# Patient Record
Sex: FEMALE | Race: BLACK OR AFRICAN AMERICAN | Employment: OTHER | URBAN - NONMETROPOLITAN AREA
[De-identification: names, ages, dates, MRNs, and addresses within clinical notes are randomized per-mention and may not be internally consistent; named-entity substitution may affect disease eponyms.]

---

## 2022-01-17 ENCOUNTER — HOSPITAL ENCOUNTER (EMERGENCY)
Age: 52
Discharge: HOME | End: 2022-01-17
Payer: COMMERCIAL

## 2022-01-17 ENCOUNTER — APPOINTMENT (OUTPATIENT)
Dept: GENERAL RADIOLOGY | Age: 52
End: 2022-01-17
Payer: COMMERCIAL

## 2022-01-17 VITALS
DIASTOLIC BLOOD PRESSURE: 79 MMHG | OXYGEN SATURATION: 98 % | TEMPERATURE: 97.7 F | SYSTOLIC BLOOD PRESSURE: 134 MMHG | RESPIRATION RATE: 16 BRPM | HEART RATE: 91 BPM

## 2022-01-17 DIAGNOSIS — J12.82 PNEUMONIA DUE TO COVID-19 VIRUS: Primary | ICD-10-CM

## 2022-01-17 DIAGNOSIS — U07.1 PNEUMONIA DUE TO COVID-19 VIRUS: Primary | ICD-10-CM

## 2022-01-17 PROCEDURE — 71046 X-RAY EXAM CHEST 2 VIEWS: CPT

## 2022-01-17 PROCEDURE — G0463 HOSPITAL OUTPT CLINIC VISIT: HCPCS

## 2022-01-17 PROCEDURE — 99203 OFFICE O/P NEW LOW 30 MIN: CPT

## 2022-01-17 PROCEDURE — 99202 OFFICE O/P NEW SF 15 MIN: CPT | Performed by: NURSE PRACTITIONER

## 2022-01-17 RX ORDER — DOXYCYCLINE HYCLATE 100 MG
100 TABLET ORAL 2 TIMES DAILY
Qty: 20 TABLET | Refills: 0 | Status: SHIPPED | OUTPATIENT
Start: 2022-01-17 | End: 2022-01-27

## 2022-01-17 RX ORDER — PREDNISONE 20 MG/1
20 TABLET ORAL 2 TIMES DAILY
Qty: 10 TABLET | Refills: 0 | Status: SHIPPED | OUTPATIENT
Start: 2022-01-17 | End: 2022-01-22

## 2022-01-17 RX ORDER — DEXTROMETHORPHAN HYDROBROMIDE AND PROMETHAZINE HYDROCHLORIDE 15; 6.25 MG/5ML; MG/5ML
5 SYRUP ORAL 4 TIMES DAILY PRN
Qty: 120 ML | Refills: 0 | Status: SHIPPED | OUTPATIENT
Start: 2022-01-17 | End: 2022-01-24

## 2022-01-17 ASSESSMENT — ENCOUNTER SYMPTOMS
BACK PAIN: 0
SORE THROAT: 0
SHORTNESS OF BREATH: 0
TROUBLE SWALLOWING: 0
RHINORRHEA: 0
NAUSEA: 0
SINUS CONGESTION: 0
SINUS PRESSURE: 0
VOMITING: 0
COUGH: 1
WHEEZING: 1
DIARRHEA: 0

## 2022-01-17 NOTE — ED PROVIDER NOTES
[facility]       CHIEF COMPLAINT       Chief Complaint   Patient presents with    Post-COVID Symptoms     x10 days        Nurses Notes reviewed and I agree except as noted in the HPI. HISTORY OF PRESENT ILLNESS   Esme Christianson is a 54 y.o. female who presents to the urgent care center with  stating that she is on day 8 of being diagnosed with COVID. Patient apparently has had some ups and downs over the past 10-day. With chills and fever and cough. The patient does have a pulse oximetry at home patient is sitting on table questions at the bedside they stated that her pulse oximetry has fluctuated between 91 and 94% over the past 10 days. Patient stated that she did start coughing up some blood-tinged sputum as well.  stated that when she was lying in bed at rest at times her heart rate would be up into the 120's. The patient stated that she would get short of breath with this exertion but that seems to be getting better. Patient at the present time is sitting in the chair skin is warm and dry does not appear to be in any acute distress. Patient denies any chest pain or shortness of breath at the present time. There is been no nausea vomiting or diarrhea. The history is provided by the patient. No  was used.    Cough  Cough characteristics:  Productive  Sputum characteristics:  Bloody and clear  Severity:  Mild  Onset quality:  Gradual  Duration:  1 day  Timing:  Intermittent  Progression:  Waxing and waning  Chronicity:  New  Smoker: no    Context: sick contacts    Context comment:   has COVID  Relieved by:  None tried  Worsened by:  Nothing  Ineffective treatments:  None tried  Associated symptoms: wheezing    Associated symptoms: no chest pain, no chills, no diaphoresis, no ear pain, no fever, no headaches, no rash, no rhinorrhea, no shortness of breath, no sinus congestion and no sore throat    Wheezing: Severity:  Mild    Onset quality:  Gradual    Duration:  1 week    Timing:  Intermittent    Progression:  Waxing and waning    Chronicity:  New  Risk factors: recent infection        REVIEW OF SYSTEMS     Review of Systems   Constitutional: Negative for activity change, appetite change, chills, diaphoresis, fatigue and fever. HENT: Positive for congestion. Negative for ear pain, rhinorrhea, sinus pressure, sore throat and trouble swallowing. Respiratory: Positive for cough and wheezing. Negative for shortness of breath. Cardiovascular: Negative for chest pain. Gastrointestinal: Negative for diarrhea, nausea and vomiting. Musculoskeletal: Negative for back pain and neck stiffness. Skin: Negative for rash. Allergic/Immunologic: Negative for environmental allergies. Neurological: Negative for dizziness, light-headedness and headaches. Hematological: Negative for adenopathy. PAST MEDICAL HISTORY   History reviewed. No pertinent past medical history. SURGICALHISTORY     Patient  has no past surgical history on file. CURRENT MEDICATIONS       Previous Medications    No medications on file       ALLERGIES     Patient is has No Known Allergies. Patients   There is no immunization history on file for this patient. FAMILY HISTORY     Patient's family history is not on file. SOCIAL HISTORY     Patient  reports that she has never smoked. She has never used smokeless tobacco.    PHYSICAL EXAM     ED TRIAGE VITALS  BP: 134/79, Temp: 97.7 °F (36.5 °C), Pulse: 91, Resp: 16, SpO2: 98 %,There is no height or weight on file to calculate BMI.,No LMP recorded. Physical Exam  Vitals and nursing note reviewed. Constitutional:       General: She is not in acute distress. Appearance: Normal appearance. She is well-developed and well-groomed. She is not ill-appearing, toxic-appearing or diaphoretic. HENT:      Head: Normocephalic.       Right Ear: Hearing, tympanic membrane, ear canal and external ear normal. No drainage, swelling or tenderness. No mastoid tenderness. Left Ear: Hearing, tympanic membrane, ear canal and external ear normal. No drainage, swelling or tenderness. No mastoid tenderness. Nose: Nose normal.      Right Sinus: No maxillary sinus tenderness or frontal sinus tenderness. Left Sinus: No maxillary sinus tenderness or frontal sinus tenderness. Mouth/Throat:      Lips: Pink. Mouth: Mucous membranes are moist.      Pharynx: Uvula midline. No posterior oropharyngeal erythema or uvula swelling. Tonsils: No tonsillar exudate or tonsillar abscesses. Eyes:      Conjunctiva/sclera: Conjunctivae normal.      Pupils: Pupils are equal, round, and reactive to light. Cardiovascular:      Rate and Rhythm: Normal rate and regular rhythm. Heart sounds: Normal heart sounds. Pulmonary:      Effort: Pulmonary effort is normal. No accessory muscle usage or respiratory distress. Breath sounds: Examination of the right-lower field reveals wheezing and rales. Examination of the left-lower field reveals wheezing and rales. Wheezing and rales present. No decreased breath sounds or rhonchi. Chest:   Breasts:      Right: No supraclavicular adenopathy. Left: No supraclavicular adenopathy. Abdominal:      General: Bowel sounds are normal.      Palpations: Abdomen is soft. Tenderness: There is no right CVA tenderness or guarding. Negative signs include Lazo's sign. Musculoskeletal:      Cervical back: Full passive range of motion without pain and normal range of motion. No rigidity. Lymphadenopathy:      Head:      Right side of head: No submental, submandibular, tonsillar, preauricular, posterior auricular or occipital adenopathy. Left side of head: No submental, submandibular, tonsillar, preauricular, posterior auricular or occipital adenopathy. Cervical: No cervical adenopathy.       Right cervical: No superficial, deep or posterior cervical adenopathy. Left cervical: No superficial, deep or posterior cervical adenopathy. Upper Body:      Right upper body: No supraclavicular adenopathy. Left upper body: No supraclavicular adenopathy. Skin:     General: Skin is warm and dry. Capillary Refill: Capillary refill takes less than 2 seconds. Findings: No rash. Neurological:      Mental Status: She is alert and oriented to person, place, and time. Psychiatric:         Mood and Affect: Mood normal.         Behavior: Behavior normal. Behavior is cooperative. Wells score:    0  (points) clinically suspected DVT - 3 points  0  (points)alternative diagnosis is less likely than PE - 3.0 points  0  (points) tachycardia > 100 - 1.5 points  0  (points) immobile at least 3 days or surgery in previous four weeks - 1.5points  0  (points) history of DVT or PE - 1.5 points  1  (points) hemoptysis - 1.0 points  0  (points) malignancy (treatment for within 6 months, palliative) - 1.0 points    Score:    Interpretation Inga Johnson et al. 2007 Radiology 242:15-21)   Score >6.0 High (probablility 59% based on pooled data)   Score 2.0-6.0 Moderate  (probability 29% based on pooled data)   Score <2.0 - Low (probability 15% based on pooled data    I do not suspect PE per [facility] criteria:   Age less than 50   Pulse ox greater than or equal to 98% on room air   Heart rate less than 100   No prior venous embolism   No recent surgery or trauma requiring hospitalization within the prior 4 weeks   No hemoptysis   No estrogen use   No unilateral leg swelling    Inga Johnson et al. 2007 Radiology 242:[id number])  2006;[id number]. DIAGNOSTIC RESULTS     Labs:No results found for this visit on 22. IMAGIN:30 pm patient ambulatory to x-ray without any assistance. XR CHEST (2 VW)   Final Result   1. Hazy right middle lobe consolidation is seen. Findings can relate to infiltrates.             **This report has been created using voice recognition software. It may contain minor errors which are inherent in voice recognition technology. **      Final report electronically signed by Dr Alvaro Oh on 1/17/2022 6:00 PM            EKG:      URGENT CARE COURSE:     Vitals:    01/17/22 1637   BP: 134/79   Pulse: 91   Resp: 16   Temp: 97.7 °F (36.5 °C)   TempSrc: Tympanic   SpO2: 98%       Medications - No data to display         PROCEDURES:  None    FINAL IMPRESSION      1. Pneumonia due to COVID-19 virus          DISPOSITION/ PLAN      The patient/Patient representative was advised to rest, drink lots of fluids, take Motrin and Tylenol for any fever, chills generalized body aches. The patient was also advised to monitor urine output for signs of dehydration. If the patient develops any chest pain, shortness of breath, neck pain or stiffness or abdominal pain or any other concerns, the patient is to call 911 or go to the emergency department for further evaluation. If the patient does not experience any of the above symptoms he is to follow-up with his primary care provider in 2-3 days for reevaluation. The patient/Patient representative are agreeable to the treatment plan at this time. The patient left the urgent care center in stable condition. PATIENT REFERRED TO:  No primary care provider on file. No primary physician on file.       DISCHARGE MEDICATIONS:  New Prescriptions    DOXYCYCLINE HYCLATE (VIBRA-TABS) 100 MG TABLET    Take 1 tablet by mouth 2 times daily for 10 days    PREDNISONE (DELTASONE) 20 MG TABLET    Take 1 tablet by mouth 2 times daily for 5 days    PROMETHAZINE-DEXTROMETHORPHAN (PROMETHAZINE-DM) 6.25-15 MG/5ML SYRUP    Take 5 mLs by mouth 4 times daily as needed for Cough       Discontinued Medications    No medications on file       Current Discharge Medication List          PHILL Kim - CNP    (Please note that portions of this note were completed with a voice recognition program. Efforts were made to edit the dictations but occasionally words are mis-transcribed.)           Marianne Buckner, PHILL - CNP  01/17/22 [id number]

## 2022-01-19 RX ORDER — ALBUTEROL SULFATE 90 UG/1
2 AEROSOL, METERED RESPIRATORY (INHALATION) EVERY 4 HOURS PRN
Qty: 1 EACH | Refills: 0 | Status: SHIPPED | OUTPATIENT
Start: 2022-01-19 | End: 2022-04-04

## 2022-01-19 NOTE — ED NOTES
Pt called in asking for inhaler. After reviewing with placido mireles. Pt advised we would send it over.  Pt verbalized understanding     Brook Sims RN  01/19/22 1007

## 2022-02-09 ENCOUNTER — TELEPHONE (OUTPATIENT)
Dept: FAMILY MEDICINE CLINIC | Age: 52
End: 2022-02-09

## 2022-02-09 ENCOUNTER — OFFICE VISIT (OUTPATIENT)
Dept: FAMILY MEDICINE CLINIC | Age: 52
End: 2022-02-09
Payer: COMMERCIAL

## 2022-02-09 VITALS
HEIGHT: 66 IN | TEMPERATURE: 98.2 F | OXYGEN SATURATION: 98 % | RESPIRATION RATE: 16 BRPM | BODY MASS INDEX: 33.46 KG/M2 | HEART RATE: 88 BPM | DIASTOLIC BLOOD PRESSURE: 70 MMHG | WEIGHT: 208.2 LBS | SYSTOLIC BLOOD PRESSURE: 110 MMHG

## 2022-02-09 DIAGNOSIS — Z13.220 LIPID SCREENING: ICD-10-CM

## 2022-02-09 DIAGNOSIS — G57.92 NEUROPATHY OF LEFT ANKLE: ICD-10-CM

## 2022-02-09 DIAGNOSIS — Z13.1 SCREENING FOR DIABETES MELLITUS (DM): ICD-10-CM

## 2022-02-09 DIAGNOSIS — Z12.11 COLON CANCER SCREENING: ICD-10-CM

## 2022-02-09 DIAGNOSIS — Z00.00 ANNUAL PHYSICAL EXAM: Primary | ICD-10-CM

## 2022-02-09 DIAGNOSIS — Z12.31 ENCOUNTER FOR SCREENING MAMMOGRAM FOR MALIGNANT NEOPLASM OF BREAST: ICD-10-CM

## 2022-02-09 PROCEDURE — 99386 PREV VISIT NEW AGE 40-64: CPT | Performed by: NURSE PRACTITIONER

## 2022-02-09 RX ORDER — AMITRIPTYLINE HYDROCHLORIDE 10 MG/1
TABLET, FILM COATED ORAL
COMMUNITY
Start: 2022-01-12 | End: 2022-10-21

## 2022-02-09 SDOH — ECONOMIC STABILITY: FOOD INSECURITY: WITHIN THE PAST 12 MONTHS, YOU WORRIED THAT YOUR FOOD WOULD RUN OUT BEFORE YOU GOT MONEY TO BUY MORE.: NEVER TRUE

## 2022-02-09 SDOH — ECONOMIC STABILITY: FOOD INSECURITY: WITHIN THE PAST 12 MONTHS, THE FOOD YOU BOUGHT JUST DIDN'T LAST AND YOU DIDN'T HAVE MONEY TO GET MORE.: NEVER TRUE

## 2022-02-09 ASSESSMENT — PATIENT HEALTH QUESTIONNAIRE - PHQ9
7. TROUBLE CONCENTRATING ON THINGS, SUCH AS READING THE NEWSPAPER OR WATCHING TELEVISION: 0
5. POOR APPETITE OR OVEREATING: 0
SUM OF ALL RESPONSES TO PHQ QUESTIONS 1-9: 6
2. FEELING DOWN, DEPRESSED OR HOPELESS: 1
10. IF YOU CHECKED OFF ANY PROBLEMS, HOW DIFFICULT HAVE THESE PROBLEMS MADE IT FOR YOU TO DO YOUR WORK, TAKE CARE OF THINGS AT HOME, OR GET ALONG WITH OTHER PEOPLE: 2
1. LITTLE INTEREST OR PLEASURE IN DOING THINGS: 2
3. TROUBLE FALLING OR STAYING ASLEEP: 1
SUM OF ALL RESPONSES TO PHQ QUESTIONS 1-9: 6
SUM OF ALL RESPONSES TO PHQ9 QUESTIONS 1 & 2: 3
8. MOVING OR SPEAKING SO SLOWLY THAT OTHER PEOPLE COULD HAVE NOTICED. OR THE OPPOSITE, BEING SO FIGETY OR RESTLESS THAT YOU HAVE BEEN MOVING AROUND A LOT MORE THAN USUAL: 1
SUM OF ALL RESPONSES TO PHQ QUESTIONS 1-9: 6
SUM OF ALL RESPONSES TO PHQ QUESTIONS 1-9: 6
9. THOUGHTS THAT YOU WOULD BE BETTER OFF DEAD, OR OF HURTING YOURSELF: 0
6. FEELING BAD ABOUT YOURSELF - OR THAT YOU ARE A FAILURE OR HAVE LET YOURSELF OR YOUR FAMILY DOWN: 0
4. FEELING TIRED OR HAVING LITTLE ENERGY: 1

## 2022-02-09 ASSESSMENT — ENCOUNTER SYMPTOMS
FACIAL SWELLING: 0
COLOR CHANGE: 0
COUGH: 0
BACK PAIN: 0
SHORTNESS OF BREATH: 0
TROUBLE SWALLOWING: 0
VOMITING: 0
EYE PAIN: 0
SORE THROAT: 0
NAUSEA: 0
ABDOMINAL PAIN: 0
DIARRHEA: 0
WHEEZING: 0
SINUS PAIN: 0

## 2022-02-09 ASSESSMENT — SOCIAL DETERMINANTS OF HEALTH (SDOH): HOW HARD IS IT FOR YOU TO PAY FOR THE VERY BASICS LIKE FOOD, HOUSING, MEDICAL CARE, AND HEATING?: NOT HARD AT ALL

## 2022-02-09 NOTE — PATIENT INSTRUCTIONS
Patient Education        Mammogram: About This Test  What is it? A mammogram is an X-ray of the breast that is used to screen for breast cancer. This test can find tumors that are too small for you or your doctor to feel. Cancer is most easily treated when it is found at an early stage. Why is this test done? A mammogram is done to:  · Look for breast cancer when there are no symptoms. · Find breast cancer when there are symptoms. Symptoms of breast cancer may include a lump or thickening in the breast, nipple discharge, or dimpling of the skin on one area of the breast.  · Find an area of suspicious breast tissue to remove for an exam under a microscope (biopsy). How do you prepare for the test?  If you've had a mammogram before at another clinic, have the results sent or bring them with you to your appointment. On the day of the mammogram, don't use any deodorant. And don't use perfume, powders, or ointments near or on your breasts. The residue left on your skin by these substances may interfere with the X-rays. How is the test done? · You will need to take off any jewelry that might interfere with the X-ray pictures. · You will need to take off your clothes above the waist.  · You will be given a cloth or paper gown to use during the test.  · You probably will stand during the mammogram.  · One at a time, your breasts will be placed on a flat plate. · Another plate is then pressed firmly against your breast to help flatten out the breast tissue. You may be asked to lift your arm. · For a few seconds while the X-ray picture is being taken, you will need to hold your breath. · At least two pictures are taken of each breast. One is taken from the top and one from the side. How does having a mammogram feel? A mammogram is often uncomfortable but rarely painful.  If you have sensitive or fragile skin or a skin condition, let the technician know before you have your exam. If you have menstrual periods, the procedure is more comfortable when done within 2 weeks after your period has ended. Having your breasts flattened is usually uncomfortable, but it helps the technician get the best images. How long does the test take? · The test will take about 10 to 15 minutes. You may be in the clinic for up to an hour. · You may be asked to wait a few minutes while the images are checked to make sure they don't need to be redone. What happens after the test?  · You will probably be able to go home right away. · You can go back to your usual activities right away. Follow-up care is a key part of your treatment and safety. Be sure to make and go to all appointments, and call your doctor if you are having problems. It's also a good idea to keep a list of the medicines you take. Ask your doctor when you can expect to have your test results. Where can you learn more? Go to https://oNoise.Pogoplug. org and sign in to your Airbnb account. Enter I713 in the Digitalsmiths box to learn more about \"Mammogram: About This Test.\"     If you do not have an account, please click on the \"Sign Up Now\" link. Current as of: September 8, 2021               Content Version: 13.1  © 2006-2021 Healthwise, Incorporated. Care instructions adapted under license by [facility] ([facility]). If you have questions about a medical condition or this instruction, always ask your healthcare professional. [place] [address] any warranty or liability for your use of this information. Patient Education        Colon Cancer Screening: Care Instructions  Overview     Colorectal cancer occurs in the colon or rectum. That's the lower part of your digestive system. It often starts in small growths called polyps in the colon or rectum. Polyps are usually found with screening tests. Depending on the type of test, any polyps found may be removed during the tests.   Colorectal cancer usually does not cause symptoms at first. But regular tests can help find it early, before it spreads and becomes harder to treat. Your risk for colorectal cancer gets higher as you get older. Experts recommend starting screening at age 39 for people who are at average risk. Talk with your doctor about your risk and when to start and stop screening. You may have one of several tests. Follow-up care is a key part of your treatment and safety. Be sure to make and go to all appointments, and call your doctor if you are having problems. It's also a good idea to know your test results and keep a list of the medicines you take. What are the main screening tests for colon cancer? The screening tests are:  Stool tests. These include the guaiac fecal occult blood test (gFOBT), the fecal immunochemical test (FIT), and the combined fecal immunochemical test and stool DNA test (FIT-DNA). These tests check stool samples for signs of cancer. If your test is positive, you will need to have a colonoscopy. Sigmoidoscopy. This test lets your doctor look at the lining of your rectum and the lowest part of your colon. Your doctor uses a lighted tube called a sigmoidoscope. This test can't find cancers or polyps in the upper part of your colon. In some cases, polyps that are found can be removed. But if your doctor finds polyps, you will need to have a colonoscopy to check the upper part of your colon. Colonoscopy. This test lets your doctor look at the lining of your rectum and your entire colon. The doctor uses a thin, flexible tool called a colonoscope. It can also be used to remove polyps or get a tissue sample (biopsy). A less common test is CT colonography (CTC). It's also called virtual colonoscopy. Who should be screened for colorectal cancer? Your risk for colorectal cancer gets higher as you get older. Experts recommend starting screening at age 39 for people who are at average risk.  Talk with your doctor about your risk and when to start and stop screening. How often you need screening depends on the type of test you get:  Stool tests. Every year for FIT or gFOBT. Every 1 to 3 years for sDNA, also called FIT-DNA. Tests that look inside the colon. Every 5 years for sigmoidoscopy. (If you do the FIT test every year, you can get this test every 10 years.)  Every 5 years for CT colonography (virtual colonoscopy). Every 10 years for colonoscopy. Experts agree that people at higher risk may need to be tested sooner and more often. This includes people who have a strong family history of colon cancer. Talk to your doctor about which test is best for you and when to be tested. When should you call for help? Watch closely for changes in your health, and be sure to contact your doctor if:    · You have any changes in your bowel habits.     · You have any problems. Where can you learn more? Go to https://JackBepeAfluenta.RelayFoods. org and sign in to your True Office account. Enter [phone] [id number] [phone] in the PromisePay box to learn more about \"Colon Cancer Screening: Care Instructions. \"     If you do not have an account, please click on the \"Sign Up Now\" link. Current as of: September 8, 2021               Content Version: 13.1  © 2006-2021 [employer]. Care instructions adapted under license by [facility] ([facility]). If you have questions about a medical condition or this instruction, always ask your healthcare professional. [place] [address] any warranty or liability for your use of this information. Patient Education        Well Visit, Ages 25 to 48: Care Instructions  Overview     Well visits can help you stay healthy. Your doctor has checked your overall health and may have suggested ways to take good care of yourself. Your doctor also may have recommended tests. At home, you can help prevent illness with healthy eating, regular exercise, and other steps. Follow-up care is a key part of your treatment and safety.  Be sure to make and go to all appointments, and call your doctor if you are having problems. It's also a good idea to know your test results and keep a list of the medicines you take. How can you care for yourself at home? · Get screening tests that you and your doctor decide on. Screening helps find diseases before any symptoms appear. · Eat healthy foods. Choose fruits, vegetables, whole grains, protein, and low-fat dairy foods. Limit fat, especially saturated fat. Reduce salt in your diet. · Limit alcohol. If you are a man, have no more than 2 drinks a day or 14 drinks a week. If you are a woman, have no more than 1 drink a day or 7 drinks a week. · Get at least 30 minutes of physical activity on most days of the week. Walking is a good choice. You also may want to do other activities, such as running, swimming, cycling, or playing tennis or team sports. Discuss any changes in your exercise program with your doctor. · Reach and stay at a healthy weight. This will lower your risk for many problems, such as obesity, diabetes, heart disease, and high blood pressure. · Do not smoke or allow others to smoke around you. If you need help quitting, talk to your doctor about stop-smoking programs and medicines. These can increase your chances of quitting for good. · Care for your mental health. It is easy to get weighed down by worry and stress. Learn strategies to manage stress, like deep breathing and mindfulness, and stay connected with your family and community. If you find you often feel sad or hopeless, talk with your doctor. Treatment can help. · Talk to your doctor about whether you have any risk factors for sexually transmitted infections (STIs). You can help prevent STIs if you wait to have sex with a new partner (or partners) until you've each been tested for STIs. It also helps if you use condoms (male or female condoms) and if you limit your sex partners to one person who only has sex with you.  Vaccines are available for some STIs, such as HPV. · Use birth control if it's important to you to prevent pregnancy. Talk with your doctor about the choices available and what might be best for you. · If you think you may have a problem with alcohol or drug use, talk to your doctor. This includes prescription medicines (such as amphetamines and opioids) and illegal drugs (such as cocaine and methamphetamine). Your doctor can help you figure out what type of treatment is best for you. · Protect your skin from too much sun. When you're outdoors from 10 a.m. to 4 p.m., stay in the shade or cover up with clothing and a hat with a wide brim. Wear sunglasses that block UV rays. Even when it's cloudy, put broad-spectrum sunscreen (SPF 30 or higher) on any exposed skin. · See a dentist one or two times a year for checkups and to have your teeth cleaned. · Wear a seat belt in the car. When should you call for help? Watch closely for changes in your health, and be sure to contact your doctor if you have any problems or symptoms that concern you. Where can you learn more? Go to https://EcoStartpepiceweb.healthmyParcelDelivery. org and sign in to your OpenChime account. Enter P072 in the Hibernia Networks box to learn more about \"Well Visit, Ages 25 to 48: Care Instructions. \"     If you do not have an account, please click on the \"Sign Up Now\" link. Current as of: October 6, 2021               Content Version: 13.1  © 2006-2021 Healthwise, Incorporated. Care instructions adapted under license by [facility] ([facility]). If you have questions about a medical condition or this instruction, always ask your healthcare professional. [place] [address] any warranty or liability for your use of this information.

## 2022-02-09 NOTE — TELEPHONE ENCOUNTER
Noted.  New prescription sent.  -WS    Orders Placed This Encounter   Medications    diclofenac sodium (VOLTAREN) 1 % GEL     Sig: Apply 4 g topically 4 times daily as needed for Pain     Dispense:  100 g     Refill:  5

## 2022-02-09 NOTE — PROGRESS NOTES
[address] [facility] [address]  Dept: [phone]  Dept Fax: [phone]: [phone]     2022    Jd Hansen (:  1970) is a 54 y.o. female, here for evaluation of the following medical concerns:  Chief Complaint   Patient presents with    New Patient     Get established. Patient had Covid about 2 weeks ago and would like to follow up on that. She had pneumonia at the same time with her Covid. She was put on antibiotics which she finished. She would also like to discuss her left foot and chronic pain as well. Will be seeing a dr on the  about nerve pain in her foot/ankle. HPI    Pt presents to the office today for new patient appt. She moved here from [place]. Pt has a 2 year HX of left foot pain and has had multiple surgeries on it. Will be following up with Dr Lupis aMuricio for a second opinion. Last surgery was Aug 2021. Wearing a walking boot from the MD in [facility]. She also has surgery in [place]. There is some confusion on where the pain is coming from and she hopes Dr Robe Chan will be able to sort this out for her. See note below from care everywhere from 2021. From [place] Provider about neuroma of the left foot 2021 from Care everywhere charting: The clinical nature of this call was regarding the results of her injection of her left terminal calcaneal branch. I reviewed the injection from  with Dr. Lily Stark. It went well with induced numbness in her heel. My findings were that the injection increased numbness in her heel. It helped with her pain until that evening, but she did not like the feeling of numbness. It reduced her pain by 50%. I did not expect it to completely get rid of her pain since she also has a sural nerve neuroma. COVID symptoms improved. She no longer has much drainage. Denies any chest pain or SOB. Ablation in 2019- No PAP since.  She is due. Mammogram is UTD, but is due soon. No other health problems. Review of Systems   Constitutional: Negative for chills, fatigue and fever. HENT: Negative for congestion, facial swelling, sinus pain, sore throat and trouble swallowing. Eyes: Negative for pain and visual disturbance. Respiratory: Negative for cough, shortness of breath and wheezing. Cardiovascular: Negative for chest pain and palpitations. Gastrointestinal: Negative for abdominal pain, diarrhea, nausea and vomiting. Genitourinary: Negative for difficulty urinating, dysuria and urgency. Musculoskeletal: Negative for back pain, gait problem and neck pain. Left foot pain and swelling. Skin: Negative for color change and rash. Neurological: Negative for dizziness, weakness and headaches. Psychiatric/Behavioral: Negative for agitation and sleep disturbance. The patient is not nervous/anxious. Prior to Visit Medications    Medication Sig Taking?  Authorizing Provider   amitriptyline (ELAVIL) 10 MG tablet TAKE 1 TABLET BY MOUTH EVERY DAY Yes Historical Provider, MD   Aspirin 81 MG CAPS daily Yes Historical Provider, MD   OLIVE LEAF PO Take by mouth Yes Historical Provider, MD   ZINC PO Take by mouth Yes Historical Provider, MD   Ascorbic Acid (VITAMIN C PO) Take by mouth Yes Historical Provider, MD   VITAMIN D PO Take by mouth Yes Historical Provider, MD   Cyanocobalamin (VITAMIN B-12 PO) Take by mouth Yes Historical Provider, MD   Probiotic Product (PROBIOTIC PO) Take by mouth Yes Historical Provider, MD   diclofenac sodium (VOLTAREN) 1 % GEL Apply topically 2 times daily Yes Vensusie Salaam APRN - CNP   albuterol sulfate  (90 Base) MCG/ACT inhaler Inhale 2 puffs into the lungs every 4 hours as needed for Wheezing or Shortness of Breath Yes Tasha Carranza APRN - CNP   diclofenac sodium (VOLTAREN) 1 % GEL Apply 4 g topically 4 times daily as needed for Pain  Venida Salaam, APRN - CNP Allergies   Allergen Reactions    Oxycodone Itching    Shellfish Allergy Anaphylaxis and Itching       History reviewed. No pertinent past medical history. Past Surgical History:   Procedure Laterality Date    ANTERIOR CRUCIATE LIGAMENT REPAIR Left 2006     SECTION  1995    FOOT SURGERY Left 2020    Flat foot reconstruction    NERVE GRAFT Left 2021    Left foot nerve graft    OTHER SURGICAL HISTORY  2019    Ablation surgery       Social History     Socioeconomic History    Marital status:      Spouse name: Not on file    Number of children: Not on file    Years of education: Not on file    Highest education level: Not on file   Occupational History    Not on file   Tobacco Use    Smoking status: Never Smoker    Smokeless tobacco: Never Used   Vaping Use    Vaping Use: Never used   Substance and Sexual Activity    Alcohol use: Not on file    Drug use: Never    Sexual activity: Not on file   Other Topics Concern    Not on file   Social History Narrative    Not on file     Social Determinants of Health     Financial Resource Strain: Low Risk     Difficulty of Paying Living Expenses: Not hard at all   Food Insecurity: No Food Insecurity    Worried About [address] in the Last Year: Never true    [address] in the Last Year: Never true   Transportation Needs:     Lack of Transportation (Medical): Not on file    Lack of Transportation (Non-Medical):  Not on file   Physical Activity:     Days of Exercise per Week: Not on file    Minutes of Exercise per Session: Not on file   Stress:     Feeling of Stress : Not on file   Social Connections:     Frequency of Communication with Friends and Family: Not on file    Frequency of Social Gatherings with Friends and Family: Not on file    Attends Quaker Services: Not on file    Active Member of Clubs or Organizations: Not on file    Attends Club or Organization Meetings: Not on file    Marital Status: Not on file   Intimate Partner Violence:     Fear of Current or Ex-Partner: Not on file    Emotionally Abused: Not on file    Physically Abused: Not on file    Sexually Abused: Not on file   Housing Stability:     Unable to Pay for Housing in the Last Year: Not on file    Number of [place] in the Last Year: Not on file    Unstable Housing in the Last Year: Not on file        Family History   Problem Relation Age of Onset    Diabetes Mother        Vitals:    02/09/22 1326   BP: 110/70   Pulse: 88   Resp: 16   Temp: 98.2 °F (36.8 °C)   TempSrc: Oral   SpO2: 98%   Weight: 208 lb 3.2 oz (94.4 kg)   Height: 5' 5.55\" (1.665 m)     Estimated body mass index is 34.07 kg/m² as calculated from the following:    Height as of this encounter: 5' 5.55\" (1.665 m). Weight as of this encounter: 208 lb 3.2 oz (94.4 kg). Physical Exam  Vitals reviewed. Constitutional:       General: She is not in acute distress. Appearance: Normal appearance. She is well-developed. HENT:      Head: Normocephalic and atraumatic. Right Ear: Hearing, ear canal and external ear normal.      Left Ear: Hearing, ear canal and external ear normal.      Nose: Nose normal. No nasal tenderness. Mouth/Throat:      Lips: Pink. Mouth: Mucous membranes are moist. No oral lesions. Pharynx: Oropharynx is clear. Uvula midline. Eyes:      General:         Right eye: No discharge. Left eye: No discharge. Conjunctiva/sclera: Conjunctivae normal.   Neck:      Vascular: No carotid bruit. Trachea: No tracheal deviation. Cardiovascular:      Rate and Rhythm: Normal rate and regular rhythm. Heart sounds: Normal heart sounds. No murmur heard. Pulmonary:      Effort: Pulmonary effort is normal. No respiratory distress. Breath sounds: Normal breath sounds. Abdominal:      General: Bowel sounds are normal.      Palpations: Abdomen is soft. Tenderness: There is no abdominal tenderness. Musculoskeletal:      Cervical back: Full passive range of motion without pain and neck supple. Left foot: Decreased range of motion. Swelling and tenderness present. Feet:      Left foot:      Skin integrity: Dry skin present. No erythema or warmth. Toenail Condition: Left toenails are normal.   Lymphadenopathy:      Head:      Right side of head: No submental, submandibular, tonsillar, preauricular, posterior auricular or occipital adenopathy. Left side of head: No submental, submandibular, tonsillar, preauricular, posterior auricular or occipital adenopathy. Cervical: No cervical adenopathy. Skin:     General: Skin is warm and dry. Findings: No rash. Neurological:      General: No focal deficit present. Mental Status: She is alert and oriented to person, place, and time. Coordination: Coordination normal.   Psychiatric:         Behavior: Behavior normal.         Thought Content: Thought content normal.         Judgment: Judgment normal.         ASSESSMENT/PLAN:  1. Neuropathy of left ankle  - diclofenac sodium (VOLTAREN) 1 % GEL; Apply topically 2 times daily  Dispense: 150 g; Refill: 5    2. Encounter for screening mammogram for malignant neoplasm of breast  - ADRIANA DIGITAL SCREEN W OR WO CAD BILATERAL; Future    3. Colon cancer screening  - Fecal DNA Colorectal cancer screening (Cologuard)    4. Lipid screening  - Lipid Panel; Future    5. Screening for diabetes mellitus (DM)  - Glucose, Fasting; Future    6. Annual physical exam    - Follow up with Dr Zack Barnes as planned. Please let our office know if we can help with anything else. - Wear walking boot until follow up  - Have labs completed after 12 hours fasting.   - We can do PAP testing in our office. Pt will think about and make an appt if needed.    - Call office with any questions or concerns, or if symptoms are getting worse or changing      Return in about 1 year (around 2/9/2023) for Wellness/Physical, Routine follow up. Patient given educational materials - see patient instructions. Discussed use, benefit, and side effects of prescribed medications. All patient questions answered. Pt voiced understanding. Reviewed health maintenance. An  electronic signature was used to authenticate this note.     --PHILL Boland - CNP on 2/10/2022 at 7:35 AM

## 2022-02-25 ENCOUNTER — OFFICE VISIT (OUTPATIENT)
Dept: FAMILY MEDICINE CLINIC | Age: 52
End: 2022-02-25
Payer: COMMERCIAL

## 2022-02-25 VITALS
SYSTOLIC BLOOD PRESSURE: 120 MMHG | RESPIRATION RATE: 16 BRPM | WEIGHT: 206.2 LBS | DIASTOLIC BLOOD PRESSURE: 72 MMHG | HEART RATE: 72 BPM | TEMPERATURE: 98.2 F | BODY MASS INDEX: 33.74 KG/M2

## 2022-02-25 DIAGNOSIS — K64.4 EXTERNAL HEMORRHOIDS: Primary | ICD-10-CM

## 2022-02-25 DIAGNOSIS — G47.9 SLEEPING DIFFICULTY: ICD-10-CM

## 2022-02-25 PROCEDURE — 99214 OFFICE O/P EST MOD 30 MIN: CPT | Performed by: NURSE PRACTITIONER

## 2022-02-25 RX ORDER — ESZOPICLONE 2 MG/1
2 TABLET, FILM COATED ORAL NIGHTLY PRN
Qty: 30 TABLET | Refills: 0 | Status: SHIPPED | OUTPATIENT
Start: 2022-02-25 | End: 2022-04-07

## 2022-02-25 RX ORDER — HYDROCORTISONE ACETATE 25 MG/1
25 SUPPOSITORY RECTAL EVERY 12 HOURS
Qty: 20 SUPPOSITORY | Refills: 0 | Status: SHIPPED | OUTPATIENT
Start: 2022-02-25 | End: 2022-04-07

## 2022-02-25 ASSESSMENT — ENCOUNTER SYMPTOMS
ABDOMINAL PAIN: 0
ANAL BLEEDING: 0
RECTAL PAIN: 1
NAUSEA: 0
ABDOMINAL DISTENTION: 0
DIARRHEA: 0
BLOOD IN STOOL: 0
COLOR CHANGE: 0
BACK PAIN: 0

## 2022-02-25 NOTE — PATIENT INSTRUCTIONS
Patient Education        Hemorrhoids: Care Instructions  Overview     Hemorrhoids are swollen veins that develop in the anal canal. Bleeding during bowel movements, itching, and rectal pain are the most common symptoms. Hemorrhoids can be uncomfortable at times, but rarely are they a serious problem. Most of the time, you can treat them with simple changes to your diet and bowel habits. These changes include eating more fiber and not straining to pass stools. Most hemorrhoids don't need surgery or other treatment unless they are very large and painful or bleed a lot. Follow-up care is a key part of your treatment and safety. Be sure to make and go to all appointments, and call your doctor if you are having problems. It's also a good idea to know your test results and keep a list of the medicines you take. How can you care for yourself at home? · Sit in a few inches of warm water (sitz bath) 3 times a day and after bowel movements. The warm water helps with pain and itching. · Put ice on your anal area several times a day for 10 minutes at a time. Put a thin cloth between the ice and your skin. Follow this by placing a warm, wet towel on the area for another 10 to 20 minutes. · Take pain medicines exactly as directed. ? If the doctor gave you a prescription medicine for pain, take it as prescribed. ? If you are not taking a prescription pain medicine, ask your doctor if you can take an over-the-counter medicine. · Keep the anal area clean, but be gentle. Use water and a fragrance-free soap, or use baby wipes or medicated pads such as Tucks. · Wear cotton underwear and loose clothing to decrease moisture in the anal area. · Eat more fiber. Include foods such as whole-grain breads and cereals, raw vegetables, raw and dried fruits, and beans. · Drink plenty of fluids.  If you have kidney, heart, or liver disease and have to limit fluids, talk with your doctor before you increase the amount of fluids you drink.  · Use a stool softener that contains bran or psyllium. You can save money by buying bran or psyllium (available in bulk at most health food stores) and sprinkling it on foods or stirring it into fruit juice. Or you can use a product such as Metamucil or Hydrocil. · Practice healthy bowel habits. ? Go to the bathroom as soon as you have the urge. ? Avoid straining to pass stools. Relax and give yourself time to let things happen naturally. ? Do not hold your breath while passing stools. ? Do not read while sitting on the toilet. Get off the toilet as soon as you have finished. · Take your medicines exactly as prescribed. Call your doctor if you think you are having a problem with your medicine. When should you call for help? Call 911 anytime you think you may need emergency care. For example, call if:    · You pass maroon or very bloody stools. Call your doctor now or seek immediate medical care if:    · You have increased pain.     · You have increased bleeding. Watch closely for changes in your health, and be sure to contact your doctor if:    · Your symptoms have not improved after 3 or 4 days. Where can you learn more? Go to [email or web address] and sign in to your sfilatino account. Enter A643 in the [facility] box to learn more about \"Hemorrhoids: Care Instructions. \"     If you do not have an account, please click on the \"Sign Up Now\" link. Current as of: September 8, 2021               Content Version: 13.1  © 2006-2021 Healthwise, Incorporated. Care instructions adapted under license by [facility] ([facility]). If you have questions about a medical condition or this instruction, always ask your healthcare professional. [place] [address] any warranty or liability for your use of this information.          Patient Education         Sleep Problems: Getting Past Barriers to Powering Down (02:42)  Your health professional recommends that you watch this short online health video. Learn how to reduce technology use before bed for better sleep. Purpose:  Gives examples of barriers to reducing technology use before bed and solutions for overcoming them. Goal:  The user will identify and think about ways to solve common barriers to reducing technology use before bed. How to watch the video    Scan the QR code   OR Visit the website    https://hwi. se/r/[email or web address]   Current as of: June 21, 2021               Content Version: 13.1  © 2006-2021 Healthwise, Incorporated. Care instructions adapted under license by [facility] ([facility]). If you have questions about a medical condition or this instruction, always ask your healthcare professional. [address] any warranty or liability for your use of this information.

## 2022-02-25 NOTE — PROGRESS NOTES
[address] [facility] [address]  Dept: [phone]  Dept Fax: [phone]: [phone]     Visit Date:  2/25/2022      Patient:  Betsy Sequeira  YOB: 1970    HPI:     Chief Complaint   Patient presents with    Hemorrhoids     Pt noticed it about 4-5 days ago and she has never had one before and would like to discuss.  Discuss Medications     Would like to discuss something to help her sleep. HPI     Pt presents to the office today for issues with sleep and hemorrhoids. Noticed them in the middle of the night about 4-5 days ago  Urge to use the bathroom, but did not go. No constipation. Using tucks with relief. No rectal bleeding. No abdominal pain. BM's are normal. She knows she needs to increase fluids. Pt currently taking not Elavil 10 mg, stopped taking that but it was for her foot pain. Tylenol PM made her more groggy. She has had some prescription medication in the past with relief, only used it a few times when she really needed it. She has been very busy with her new business and all her foot issues that this is causing anxiety and trouble with sleeping. She denies any anxiety other than that. No HX of depression. She has tried trazodone in the past with no relief. Pt has also tried melatonin with no changes. Sleep- trouble going and staying asleep. Tried melatonin, no relief. 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 EMG on in 4/2/22 for her foot and she will be following up with Dr Shana Santacruz. Medications    Current Outpatient Medications:     hydrocortisone 2.5 % cream, Apply topically 2 times daily. , Disp: 28 g, Rfl: 1    eszopiclone (LUNESTA) 2 MG TABS, Take 1 tablet by mouth nightly as needed (sleep). , Disp: 30 tablet, Rfl: 0    hydrocortisone (ANUSOL-HC) 25 MG suppository, Place 1 suppository rectally every 12 hours, Disp: 20 suppository, Rfl: 0    Aspirin 81 MG CAPS, daily, Disp: , Rfl:     OLIVE LEAF PO, Take by mouth, Disp: , Rfl:     ZINC PO, Take by mouth, Disp: , Rfl:     Ascorbic Acid (VITAMIN C PO), Take by mouth, Disp: , Rfl:     VITAMIN D PO, Take by mouth, Disp: , Rfl:     Cyanocobalamin (VITAMIN B-12 PO), Take by mouth, Disp: , Rfl:     Probiotic Product (PROBIOTIC PO), Take by mouth, Disp: , Rfl:     diclofenac sodium (VOLTAREN) 1 % GEL, Apply topically 2 times daily, Disp: 150 g, Rfl: 5    albuterol sulfate  (90 Base) MCG/ACT inhaler, Inhale 2 puffs into the lungs every 4 hours as needed for Wheezing or Shortness of Breath, Disp: 1 each, Rfl: 0    amitriptyline (ELAVIL) 10 MG tablet, TAKE 1 TABLET BY MOUTH EVERY DAY (Patient not taking: Reported on 2/25/2022), Disp: , Rfl:     The patient is allergic to oxycodone and shellfish allergy. Past Medical History  Jd  has no past medical history on file. Subjective:      Review of Systems   Constitutional: Negative for chills, fatigue and fever. Gastrointestinal: Positive for rectal pain. Negative for abdominal distention, abdominal pain, anal bleeding, blood in stool, diarrhea and nausea. Genitourinary: Negative for difficulty urinating, dysuria and hematuria. Musculoskeletal: Positive for arthralgias. Negative for back pain and gait problem. Skin: Negative for color change and rash. Psychiatric/Behavioral: Positive for sleep disturbance. Negative for agitation, decreased concentration, dysphoric mood, self-injury and suicidal ideas. The patient is nervous/anxious. Objective:     /72   Pulse 72   Temp 98.2 °F (36.8 °C) (Oral)   Resp 16   Wt 206 lb 3.2 oz (93.5 kg)   BMI 33.74 kg/m²     Physical Exam  Vitals reviewed. Constitutional:       General: She is not in acute distress. Appearance: She is well-developed. HENT:      Head: Normocephalic and atraumatic. Eyes:      Conjunctiva/sclera: Conjunctivae normal.   Cardiovascular:      Rate and Rhythm: Normal rate and regular rhythm. Heart sounds: Normal heart sounds. Pulmonary:      Effort: Pulmonary effort is normal. No respiratory distress. Breath sounds: Normal breath sounds. Abdominal:      General: Bowel sounds are normal.      Palpations: Abdomen is soft. Tenderness: There is no abdominal tenderness. Genitourinary:     Rectum: Tenderness and external hemorrhoid present. Normal anal tone. Skin:     General: Skin is warm and dry. Neurological:      General: No focal deficit present. Mental Status: She is alert and oriented to person, place, and time. Coordination: Coordination normal.   Psychiatric:         Mood and Affect: Mood normal.         Behavior: Behavior normal.         Thought Content: Thought content normal.         Judgment: Judgment normal.         Assessment/Plan:      Jd was seen today for hemorrhoids and discuss medications. Diagnoses and all orders for this visit:    External hemorrhoids  -     hydrocortisone 2.5 % cream; Apply topically 2 times daily. -     hydrocortisone (ANUSOL-HC) 25 MG suppository; Place 1 suppository rectally every 12 hours    Sleeping difficulty  -     eszopiclone (LUNESTA) 2 MG TABS; Take 1 tablet by mouth nightly as needed (sleep). Controlled Substance Monitoring:    Acute and Chronic Pain Monitoring:   RX Monitoring 2/25/2022   Periodic Controlled Substance Monitoring Possible medication side effects, risk of tolerance/dependence & alternative treatments discussed. ;No signs of potential drug abuse or diversion identified. ;Assessed functional status. ;Obtaining appropriate analgesic effect of treatment. - OK to use hydrocortisone suppositories BID x 10 days. If not improving, we discussed referral to GI for further evaluations.   Pt agreeable. - May use topical hydrocortisone PRN.   - Discussed good sleeping habits like putting the phone away, exercise, increased fluids, ect. Pt may also try noise machine, humidifier or fan use in the bedroom . OK to try lunesta PRN sparingly. Pt agreeable to plan. Will follow up in the office for sleep in 2-3 months. - Greater than 50% of this 30 min visit was spent on counseling and coordination of care. Return in about 3 months (around 5/25/2022), or if symptoms worsen or fail to improve, for Routine follow up, Medication check. Patient given educational materials - see patient instructions. Discussed use, benefit, and side effects of prescribed medications. All patient questions answered. Pt voiced understanding.         Electronically signed by PHILL Guerrero CNP on 2/25/2022 at 11:04 AM

## 2022-04-04 ENCOUNTER — OFFICE VISIT (OUTPATIENT)
Dept: FAMILY MEDICINE CLINIC | Age: 52
End: 2022-04-04
Payer: COMMERCIAL

## 2022-04-04 VITALS
RESPIRATION RATE: 16 BRPM | SYSTOLIC BLOOD PRESSURE: 126 MMHG | HEART RATE: 80 BPM | DIASTOLIC BLOOD PRESSURE: 68 MMHG | TEMPERATURE: 98.4 F | BODY MASS INDEX: 33.97 KG/M2 | WEIGHT: 207.6 LBS

## 2022-04-04 DIAGNOSIS — Z01.818 PRE-OP EVALUATION: Primary | ICD-10-CM

## 2022-04-04 DIAGNOSIS — G57.92 NEUROPATHY OF LEFT ANKLE: ICD-10-CM

## 2022-04-04 PROCEDURE — 99214 OFFICE O/P EST MOD 30 MIN: CPT | Performed by: NURSE PRACTITIONER

## 2022-04-04 ASSESSMENT — ENCOUNTER SYMPTOMS
FACIAL SWELLING: 0
ABDOMINAL PAIN: 0
DIARRHEA: 0
NAUSEA: 0
SHORTNESS OF BREATH: 0
EYE PAIN: 0
SORE THROAT: 0
BACK PAIN: 0
WHEEZING: 0
COUGH: 0
VOMITING: 0
COLOR CHANGE: 0
SINUS PAIN: 0
TROUBLE SWALLOWING: 0

## 2022-04-04 NOTE — PROGRESS NOTES
[address] [facility] [address]  Dept: [phone]  Dept Fax: [phone]  Loc: [phone]    Mariposa Vo is a 54 y. o.female    Chief Complaint   Patient presents with    Pre-op Exam     Pre-op exam for surgery scheduled with Dr. Brandon Kurtz on 22. No concerns present at this time. Pt presents for PRE-OP PE for planned Left foot surgery. Surgery is scheduled for 22 with Dr. Brandon Kurtz. General anesthesia is planned. Current symptoms include left foot pain, nerve pain, previous surgery x 3. No previous issues with with anaesthesia. CURRENT EXERCISE REGIMEN: limited due to pain. >4 METS without dyspnea- Flight of stairs without dyspnea. Current medical problems include There is no problem list on file for this patient. History reviewed. No pertinent past medical history. Past Surgical History:   Procedure Laterality Date    ANTERIOR CRUCIATE LIGAMENT REPAIR Left 2006     SECTION      FOOT SURGERY Left 2020    Flat foot reconstruction    NERVE GRAFT Left 2021    Left foot nerve graft    OTHER SURGICAL HISTORY      Ablation surgery       Allergies   Allergen Reactions    Oxycodone Itching    Shellfish Allergy Anaphylaxis and Itching       Current Outpatient Medications on File Prior to Visit   Medication Sig Dispense Refill    hydrocortisone 2.5 % cream Apply topically 2 times daily. (Patient taking differently: as needed Apply topically 2 times daily.) 28 g 1    hydrocortisone (ANUSOL-HC) 25 MG suppository Place 1 suppository rectally every 12 hours (Patient taking differently: Place 25 mg rectally as needed ) 20 suppository 0    diclofenac sodium (VOLTAREN) 1 % GEL Apply topically 2 times daily 150 g 5    eszopiclone (LUNESTA) 2 MG TABS Take 1 tablet by mouth nightly as needed (sleep).  (Patient not taking: Reported on 2022) 30 tablet 0    amitriptyline (ELAVIL) 10 MG tablet TAKE 1 TABLET BY MOUTH EVERY DAY (Patient not taking: Reported on 2/25/2022)       No current facility-administered medications on file prior to visit. Family History   Problem Relation Age of Onset    Diabetes Mother        Social History     Tobacco Use    Smoking status: Never Smoker    Smokeless tobacco: Never Used   Vaping Use    Vaping Use: Never used   Substance Use Topics    Alcohol use: Not on file    Drug use: Never       Review of Systems   Constitutional: Negative for chills, fatigue and fever. HENT: Negative for congestion, facial swelling, sinus pain, sore throat and trouble swallowing. Eyes: Negative for pain and visual disturbance. Respiratory: Negative for cough, shortness of breath and wheezing. Cardiovascular: Negative for chest pain and palpitations. Gastrointestinal: Negative for abdominal pain, diarrhea, nausea and vomiting. Genitourinary: Negative for difficulty urinating, dysuria and urgency. Musculoskeletal: Positive for arthralgias and gait problem. Negative for back pain and neck pain. Skin: Negative for color change and rash. Neurological: Negative for dizziness, weakness and headaches. Psychiatric/Behavioral: Negative for agitation and sleep disturbance. The patient is not nervous/anxious. OBJECTIVE     /68   Pulse 80   Temp 98.4 °F (36.9 °C) (Oral)   Resp 16   Wt 207 lb 9.6 oz (94.2 kg)   BMI 33.97 kg/m²     Physical Exam  Vitals reviewed. Constitutional:       General: She is not in acute distress. Appearance: Normal appearance. She is well-developed. HENT:      Head: Normocephalic and atraumatic. Right Ear: Hearing, tympanic membrane, ear canal and external ear normal.      Left Ear: Hearing, tympanic membrane, ear canal and external ear normal.      Nose: Nose normal. No nasal tenderness. Mouth/Throat:      Lips: Pink. Mouth: Mucous membranes are moist. No oral lesions. Pharynx: Oropharynx is clear. Uvula midline. Eyes:      General:         Right eye: No discharge. Left eye: No discharge. Conjunctiva/sclera: Conjunctivae normal.   Neck:      Vascular: No carotid bruit. Trachea: No tracheal deviation. Cardiovascular:      Rate and Rhythm: Normal rate and regular rhythm. Heart sounds: Normal heart sounds. No murmur heard. Pulmonary:      Effort: Pulmonary effort is normal. No respiratory distress. Breath sounds: Normal breath sounds. Abdominal:      General: Bowel sounds are normal.      Palpations: Abdomen is soft. Tenderness: There is no abdominal tenderness. Musculoskeletal:      Cervical back: Full passive range of motion without pain and neck supple. Right lower leg: No edema. Left lower leg: No edema. Left foot: Decreased range of motion. No foot drop. Lymphadenopathy:      Head:      Right side of head: No submental, submandibular, tonsillar, preauricular, posterior auricular or occipital adenopathy. Left side of head: No submental, submandibular, tonsillar, preauricular, posterior auricular or occipital adenopathy. Cervical: No cervical adenopathy. Skin:     General: Skin is warm and dry. Findings: No rash. Neurological:      General: No focal deficit present. Mental Status: She is alert and oriented to person, place, and time. Coordination: Coordination normal.   Psychiatric:         Mood and Affect: Mood normal.         Behavior: Behavior normal.         Thought Content:  Thought content normal.         Judgment: Judgment normal.       Component      Latest Ref Rng & Units 4/6/2022   WBC      4.8 - 10.8 thou/mm3 6.7   RBC      4.20 - 5.40 mill/mm3 4.28   Hemoglobin Quant      12.0 - 16.0 gm/dl 12.7   Hematocrit      37.0 - 47.0 % 39.1   MCV      81.0 - 99.0 fL 91.4   MCH      26.0 - 33.0 pg 29.7   MCHC      32.2 - 35.5 gm/dl 32.5   RDW-CV      11.5 - 14.5 % 13.4   RDW-SD      35.0 - 45.0 fL 45.1 (H)   Platelet Count      071 - 400 thou/mm3 325   MPV      9.4 - 12.4 fL 8.9 (L)   Seg Neutrophils      % 58.6   Lymphocytes      % 31.5   Monocytes      % 7.1   Eosinophils      % 1.8   Basophils      % 0.7   Immature Granulocytes      % 0.3   Segs Absolute      1.8 - 7.7 thou/mm3 3.9   Lymphocytes Absolute      1.0 - 4.8 thou/mm3 2.1   Monocytes Absolute      0.4 - 1.3 thou/mm3 0.5   Eosinophils Absolute      0.0 - 0.4 thou/mm3 0.1   Basophils Absolute      0.0 - 0.1 thou/mm3 0.0   Immature Grans (Abs)      0.00 - 0.07 thou/mm3 0.02   Nucleated Red Blood Cells      /100 wbc 0   Sodium      135 - 145 meq/L 137   Potassium      3.5 - 5.2 meq/L 3.7   Chloride      98 - 111 meq/L 100   CO2      23 - 33 meq/L 24   GLUCOSE, FASTING,GF      70 - 108 mg/dL 146 (H)   BUN,BUNPL      7 - 22 mg/dL 8   Creatinine      0.4 - 1.2 mg/dL 0.7   CALCIUM, SERUM, 606020      8.5 - 10.5 mg/dL 9.4   Anion Gap      8.0 - 16.0 meq/L 13.0   Est, Glom Filt Rate      ml/min/1.73m2 >90      Ref Range & Units 4/6/22 1319   Ventricular Rate BPM 76    Atrial Rate BPM 76    P-R Interval ms 162    QRS Duration ms 88    Q-T Interval ms 386    QTc Calculation (Bazett) ms 434    P Axis degrees 65    R Axis degrees -8    T Axis degrees 40    Resulting Agency  [facility]             Narrative & Impression    Normal sinus rhythm  Normal ECG  No previous ECGs available  Confirmed by Amie Louise MD, Cailin Mazariegos ([id number]) on 4/6/2022 11:37:38 PM             Narrative   PROCEDURE: XR CHEST (2 VW)       CLINICAL INFORMATION: cough with blood-tinged sputum       COMPARISON: None       TECHNIQUE: PA and lateral views of the chest performed.           FINDINGS:            Hazy right middle lobe consolidation is seen.       Cardiac silhouette is not enlarged.        No pleural effusion. No pneumothorax.        No acute bony abnormality. Mild degenerative changes of the thoracic spine               Impression   1. Hazy right middle lobe consolidation is seen. Findings can relate to infiltrates.               **This report has been created using voice recognition software.  It may contain minor errors which are inherent in voice recognition technology. **       Final report electronically signed by Dr Alvarado Members on 1/17/2022 6:00 PM             Immunization History   Administered Date(s) Administered    Influenza Virus Vaccine 10/06/2011, 10/06/2011, 01/11/2013, 01/11/2013    Tdap (Boostrix, Adacel) 09/20/2007         Health Maintenance   Topic Date Due    COVID-19 Vaccine (1) Never done    Cervical cancer screen  Never done    Diabetes screen  Never done    Lipid screen  Never done    DTaP/Tdap/Td vaccine (2 - Td or Tdap) 09/20/2017    Colorectal Cancer Screen  Never done    Flu vaccine (Season Ended) 09/01/2022    Depression Screen  02/09/2023    Hepatitis A vaccine  Aged Out    Hepatitis B vaccine  Aged Out    Hib vaccine  Aged Out    Meningococcal (ACWY) vaccine  Aged Out    Pneumococcal 0-64 years Vaccine  Aged Out    Hepatitis C screen  Discontinued    HIV screen  Discontinued         ASSESSMENT       Diagnosis Orders   1. Pre-op evaluation     2. Neuropathy of left ankle         PLAN     - Pt medically cleared for surgery with no active contraindications. - Post op management per surgery team  - Stop all NSAIDs and ASA products 7 days before surgery. - Greater than 50% of this 30 min visit was spent on counseling and coordination of care.        Electronically signed by PHILL Charles CNP on 4/4/2022 at 11:14 AM

## 2022-04-04 NOTE — PATIENT INSTRUCTIONS
Patient Education        Learning About How to Prepare for Surgery  How can you prepare before surgery? You can do some things that will help you safely prepare for surgery.  Understand exactly what surgery is planned. You should know the risks, benefits, and other options.  Tell your doctors ALL the medicines, vitamins, supplements, and herbal remedies you take. Some of these can increase the risk of bleeding. Or they may interact withanesthesia.  Follow your doctor's instructions about which medicines to take or stop before your surgery. ? You may need to stop taking some medicines a week or more before surgery. ? If you take aspirin or some other blood thinner, be sure to talk to your doctor.  Follow any other instructions your doctor gave you.  If you have an advance directive, let your doctor know, and bring a copy to the hospital.   It may include a living will and a durable power of  for health care. It lets your doctor and loved ones know your health care wishes. If you don'thave one, you may want to prepare one. How can you prepare on the day of surgery? Here are some tips about what to do at home before you leave for your surgery.  If your doctor told you to take your medicines on the day of surgery, take them with only a sip of water.  Follow the instructions about when to stop eating and drinking. If you don't, your surgery may be canceled.  Follow your doctor's instructions about when to bathe or shower before your surgery.  Don't use lotions, perfumes, deodorants, or nail polish.  Do not shave the surgical site yourself.  Take off all jewelry and piercings.  Take out contact lenses, if you wear them.  Have a picture ID ready to take with you. Your ID will be checked before your surgery.  Know when to call your doctor. Call your doctor if you:  ? Become ill before surgery. ? Need to reschedule. ?  Have changed your mind about having the surgery. What happens before surgery? Here are some things you can expect to happen before your surgery.  Your picture ID will be checked.  The area of your body that needs surgery is often marked to make sure there are no errors.  You will be kept comfortable and safe by your anesthesia provider. The anesthesia may make you sleep. Or it may just numb the area being worked on. What happens when you are ready to go home? Be sure you have someone drive you home. Anesthesia and pain medicine make it unsafe for you to drive. You will get instructions about recovering from your surgery. This is called a discharge plan. It will cover things like diet, woundcare, follow-up care, driving, and getting back to your normal routine. Follow-up care is a key part of your treatment and safety. Be sure to make and go to all appointments, and call your doctor if you are having problems. It's also a good idea to know your test results and keep alist of the medicines you take. Where can you learn more? Go to https://nuPSYSpeDating Headshots Inc..Zondle. org and sign in to your Quintic account. Enter Q270 in the Acacia box to learn more about \"Learning About How to Prepare for Surgery. \"     If you do not have an account, please click on the \"Sign Up Now\" link. Current as of: October 6, 2021               Content Version: 13.2  © 8976-0169 Healthwise, Incorporated. Care instructions adapted under license by [facility] ([facility]). If you have questions about a medical condition or this instruction, always ask your healthcare professional. [place] [address] any warranty or liability for your use of this information.

## 2022-04-06 ENCOUNTER — TELEPHONE (OUTPATIENT)
Dept: FAMILY MEDICINE CLINIC | Age: 52
End: 2022-04-06

## 2022-04-06 ENCOUNTER — HOSPITAL ENCOUNTER (OUTPATIENT)
Age: 52
Discharge: HOME | End: 2022-04-06
Payer: COMMERCIAL

## 2022-04-06 LAB
ANION GAP SERPL CALCULATED.3IONS-SCNC: 13 MEQ/L (ref 8–16)
BASOPHILS # BLD: 0.7 %
BASOPHILS ABSOLUTE: 0 THOU/MM3 (ref 0–0.1)
BUN BLDV-MCNC: 8 MG/DL (ref 7–22)
CALCIUM SERPL-MCNC: 9.4 MG/DL (ref 8.5–10.5)
CHLORIDE BLD-SCNC: 100 MEQ/L (ref 98–111)
CO2: 24 MEQ/L (ref 23–33)
CREAT SERPL-MCNC: 0.7 MG/DL (ref 0.4–1.2)
EKG ATRIAL RATE: 76 BPM
EKG P AXIS: 65 DEGREES
EKG P-R INTERVAL: 162 MS
EKG Q-T INTERVAL: 386 MS
EKG QRS DURATION: 88 MS
EKG QTC CALCULATION (BAZETT): 434 MS
EKG R AXIS: -8 DEGREES
EKG T AXIS: 40 DEGREES
EKG VENTRICULAR RATE: 76 BPM
EOSINOPHIL # BLD: 1.8 %
EOSINOPHILS ABSOLUTE: 0.1 THOU/MM3 (ref 0–0.4)
ERYTHROCYTE [DISTWIDTH] IN BLOOD BY AUTOMATED COUNT: 13.4 % (ref 11.5–14.5)
ERYTHROCYTE [DISTWIDTH] IN BLOOD BY AUTOMATED COUNT: 45.1 FL (ref 35–45)
GLUCOSE BLD-MCNC: 146 MG/DL (ref 70–108)
HCT VFR BLD CALC: 39.1 % (ref 37–47)
HEMOGLOBIN: 12.7 GM/DL (ref 12–16)
IMMATURE GRANS (ABS): 0.02 THOU/MM3 (ref 0–0.07)
IMMATURE GRANULOCYTES: 0.3 %
LYMPHOCYTES # BLD: 31.5 %
LYMPHOCYTES ABSOLUTE: 2.1 THOU/MM3 (ref 1–4.8)
MCH RBC QN AUTO: 29.7 PG (ref 26–33)
MCHC RBC AUTO-ENTMCNC: 32.5 GM/DL (ref 32.2–35.5)
MCV RBC AUTO: 91.4 FL (ref 81–99)
MONOCYTES # BLD: 7.1 %
MONOCYTES ABSOLUTE: 0.5 THOU/MM3 (ref 0.4–1.3)
NUCLEATED RED BLOOD CELLS: 0 /100 WBC
PLATELET # BLD: 325 THOU/MM3 (ref 130–400)
PMV BLD AUTO: 8.9 FL (ref 9.4–12.4)
POTASSIUM SERPL-SCNC: 3.7 MEQ/L (ref 3.5–5.2)
RBC # BLD: 4.28 MILL/MM3 (ref 4.2–5.4)
SEG NEUTROPHILS: 58.6 %
SEGMENTED NEUTROPHILS ABSOLUTE COUNT: 3.9 THOU/MM3 (ref 1.8–7.7)
SODIUM BLD-SCNC: 137 MEQ/L (ref 135–145)
WBC # BLD: 6.7 THOU/MM3 (ref 4.8–10.8)

## 2022-04-06 PROCEDURE — 93005 ELECTROCARDIOGRAM TRACING: CPT | Performed by: PODIATRIST

## 2022-04-06 PROCEDURE — 85025 COMPLETE CBC W/AUTO DIFF WBC: CPT

## 2022-04-06 PROCEDURE — 93010 ELECTROCARDIOGRAM REPORT: CPT | Performed by: INTERNAL MEDICINE

## 2022-04-06 PROCEDURE — 36415 COLL VENOUS BLD VENIPUNCTURE: CPT

## 2022-04-06 PROCEDURE — 80048 BASIC METABOLIC PNL TOTAL CA: CPT

## 2022-04-06 NOTE — TELEPHONE ENCOUNTER
----- Message from PHILL Arriaga CNP sent at 4/6/2022  3:32 PM EDT -----  Reviewed labs from pre op blood work. Can we check with patient and see if this was done fasting? If so, we need to add an A1C due to elevated blood sugar. OK to call lab and see if they can add it on.  Thanks -WS

## 2022-04-06 NOTE — TELEPHONE ENCOUNTER
Noted. No need for A1C then. We will wait in the EKG reading and then clear pt for surgery.   Thanks -WS

## 2022-04-06 NOTE — TELEPHONE ENCOUNTER
Spoke with pt and reviewed labs work with her. She was not fasting for the lab work that she had completed.

## 2022-04-07 NOTE — PROGRESS NOTES
Patient instructed not to eat or drink anything after midnight the day before surgery. Please bring list of medications with the dosages & when you take them. If you do not  have a list bring the medications bottles with you. If having a MAC or general anesthetic you MUST have a . Bring photo ID & insurance information. Leave jewelry (watch ,rings, peircings) & other valuables, including extra cash, at home. Wear comfortable clean clothing. When showering or bathing the night before & morning of surgery please use  antibacterial soap.     Wear Mask

## 2022-04-13 ENCOUNTER — OFFICE VISIT (OUTPATIENT)
Dept: FAMILY MEDICINE CLINIC | Age: 52
End: 2022-04-13
Payer: COMMERCIAL

## 2022-04-13 VITALS
DIASTOLIC BLOOD PRESSURE: 84 MMHG | SYSTOLIC BLOOD PRESSURE: 122 MMHG | BODY MASS INDEX: 35.36 KG/M2 | RESPIRATION RATE: 16 BRPM | HEART RATE: 80 BPM | WEIGHT: 206 LBS

## 2022-04-13 DIAGNOSIS — J30.9 ALLERGIC RHINITIS, UNSPECIFIED SEASONALITY, UNSPECIFIED TRIGGER: ICD-10-CM

## 2022-04-13 DIAGNOSIS — J02.9 ACUTE VIRAL PHARYNGITIS: Primary | ICD-10-CM

## 2022-04-13 PROBLEM — D36.13 NEUROMA OF FOOT: Status: ACTIVE | Noted: 2021-05-06

## 2022-04-13 PROBLEM — G57.82 NEURITIS OF LEFT SURAL NERVE: Status: ACTIVE | Noted: 2020-10-05

## 2022-04-13 LAB
INFLUENZA A ANTIBODY: NEGATIVE
INFLUENZA B ANTIBODY: NEGATIVE
Lab: NORMAL
QC PASS/FAIL: NORMAL
S PYO AG THROAT QL: NORMAL
SARS-COV-2 RDRP RESP QL NAA+PROBE: NEGATIVE

## 2022-04-13 PROCEDURE — 87804 INFLUENZA ASSAY W/OPTIC: CPT | Performed by: NURSE PRACTITIONER

## 2022-04-13 PROCEDURE — 87880 STREP A ASSAY W/OPTIC: CPT | Performed by: NURSE PRACTITIONER

## 2022-04-13 PROCEDURE — 99213 OFFICE O/P EST LOW 20 MIN: CPT | Performed by: NURSE PRACTITIONER

## 2022-04-13 PROCEDURE — 87635 SARS-COV-2 COVID-19 AMP PRB: CPT | Performed by: NURSE PRACTITIONER

## 2022-04-13 ASSESSMENT — ENCOUNTER SYMPTOMS
COUGH: 0
CHANGE IN BOWEL HABIT: 0
NAUSEA: 0
FACIAL SWELLING: 0
VOMITING: 0
TROUBLE SWALLOWING: 0
SORE THROAT: 0
COLOR CHANGE: 0
WHEEZING: 0
ABDOMINAL PAIN: 0
DIARRHEA: 0
BACK PAIN: 0
SHORTNESS OF BREATH: 0
SINUS PAIN: 0

## 2022-04-13 NOTE — PATIENT INSTRUCTIONS
Patient Education        Allergies: Care Instructions  Overview     Allergies occur when your body's defense system (immune system) overreacts to certain substances. The immune system treats a harmless substance as if it were a harmful germ or virus. Many things can make this happen. These includepollens, medicine, food, dust, animal dander, and mold. Allergies can be mild or severe. Mild allergies can be managed with hometreatment. But medicine may be needed to prevent problems. Managing your allergies is an important part of staying healthy. Your doctor may suggest that you have allergy testing to help find out what is causing yourallergies. Severe allergies can cause reactions that affect your whole body (anaphylactic reactions). Your doctor may prescribe a shot of epinephrine to carry with you in case you have a severe reaction. Learn how to give yourself the shot andkeep it with you at all times. Make sure it is not . Follow-up care is a key part of your treatment and safety. Be sure to make and go to all appointments, and call your doctor if you are having problems. It's also a good idea to know your test results and keep alist of the medicines you take. How can you care for yourself at home?  If you have been told by your doctor that dust or dust mites are causing your allergy, decrease the dust around your bed:  ? Wash sheets, pillowcases, and other bedding in hot water every week. ? Use dust-proof covers for pillows, duvets, and mattresses. Avoid plastic covers because they tear easily and do not \"breathe. \" Wash as instructed on the label. ? Do not use any blankets and pillows that you do not need. ? Use blankets that you can wash in your washing machine. ? Consider removing drapes and carpets, which attract and hold dust, from your bedroom.  If you are allergic to house dust and mites, do not use home humidifiers. Your doctor can suggest ways you can control dust and mites.    Look for signs of cockroaches. Cockroaches cause allergic reactions. Use cockroach baits to get rid of them. Then, clean your home well. Cockroaches like areas where grocery bags, newspapers, empty bottles, or cardboard boxes are stored. Do not keep these inside your home, and keep trash and food containers sealed. Seal off any spots where cockroaches might enter your home.  If you are allergic to mold, get rid of furniture, rugs, and drapes that smell musty. Check for mold in the bathroom.  If you are allergic to outdoor pollen or mold spores, use air-conditioning. Change or clean all filters every month. Keep windows closed.  If you are allergic to pollen, stay inside when pollen counts are high. Use a vacuum  with a HEPA filter or a double-thickness filter at least two times each week.  Stay inside when air pollution is bad. Avoid paint fumes, perfumes, and other strong odors.  Avoid conditions that make your allergies worse. Stay away from smoke. Do not smoke or let anyone else smoke in your house. Do not use fireplaces or wood-burning stoves.  If you are allergic to your pets, change the air filter in your furnace every month. Use high-efficiency filters.  If you are allergic to pet dander, keep pets outside or out of your bedroom. Old carpet and cloth furniture can hold a lot of animal dander. You may need to replace them. When should you call for help? Give an epinephrine shot if:     You think you are having a severe allergic reaction.      You have symptoms in more than one body area, such as mild nausea and an itchy mouth. After giving an epinephrine shot call 911, even if you feel better. Call 911 if:     You have symptoms of a severe allergic reaction. These may include:  ? Sudden raised, red areas (hives) all over your body. ? Swelling of the throat, mouth, lips, or tongue. ? Trouble breathing. ? Passing out (losing consciousness).  Or you may feel very lightheaded or suddenly feel weak, confused, or restless.      You have been given an epinephrine shot, even if you feel better. Call your doctor now or seek immediate medical care if:     You have symptoms of an allergic reaction, such as:  ? A rash or hives (raised, red areas on the skin). ? Itching. ? Swelling. ? Belly pain, nausea, or vomiting. Watch closely for changes in your health, and be sure to contact your doctor if:     You do not get better as expected. Where can you learn more? Go to https://Elanti SystemspeHigh-Tech Bridge.Intercast Networks. org and sign in to your [employer] account. Enter N505 in the NoiseFree box to learn more about \"Allergies: Care Instructions. \"     If you do not have an account, please click on the \"Sign Up Now\" link. Current as of: February 10, 2021               Content Version: 13.2  © 2006-2022 [employer]leet. Care instructions adapted under license by [facility] ([facility]). If you have questions about a medical condition or this instruction, always ask your healthcare professional. [place] [address] any warranty or liability for your use of this information. Patient Education        Sore Throat: Care Instructions  Overview     Infection by bacteria or a virus causes most sore throats. Cigarette smoke, dry air, air pollution, allergies, and yelling can also cause a sore throat. Sore throats can be painful and annoying. Fortunately, most sore throats go away on their own. If you have a bacterial infection, your doctor may prescribeantibiotics. Follow-up care is a key part of your treatment and safety. Be sure to make and go to all appointments, and call your doctor if you are having problems. It's also a good idea to know your test results and keep alist of the medicines you take. How can you care for yourself at home?  If your doctor prescribed antibiotics, take them as directed. Do not stop taking them just because you feel better.  You need to take the full course of antibiotics.  Gargle with warm salt water several times a day to help reduce swelling and relieve pain. Mix 1/2 teaspoon of salt in 1 cup of warm water.  Take an over-the-counter pain medicine, such as acetaminophen (Tylenol), ibuprofen (Advil, Motrin), or naproxen (Aleve). Read and follow all instructions on the label.  Be careful when taking over-the-counter cold or flu medicines and Tylenol at the same time. Many of these medicines have acetaminophen, which is Tylenol. Read the labels to make sure that you are not taking more than the recommended dose. Too much acetaminophen (Tylenol) can be harmful.  Drink plenty of fluids. Fluids may help soothe an irritated throat. Hot fluids, such as tea or soup, may help decrease throat pain.  Use over-the-counter throat lozenges to soothe pain. Regular cough drops or hard candy may also help. These should not be given to young children because of the risk of choking.  Do not smoke or allow others to smoke around you. If you need help quitting, talk to your doctor about stop-smoking programs and medicines. These can increase your chances of quitting for good.  Use a vaporizer or humidifier to add moisture to your bedroom. Follow the directions for cleaning the machine. When should you call for help? Call your doctor now or seek immediate medical care if:     You have trouble breathing.      Your sore throat gets much worse on one side.      You have new or worse trouble swallowing.      You have a new or higher fever. Watch closely for changes in your health, and be sure to contact your doctor ifyou do not get better as expected. Where can you learn more? Go to https://PeopleLinxrussgripNote.TeleSign Corporation. org and sign in to your WHILL account. Enter N883 in the INFIMET box to learn more about \"Sore Throat: Care Instructions. \"     If you do not have an account, please click on the \"Sign Up Now\" link.   Current as of: September 8, 2021               Content Version: 13.2  © 0637-5630 Healthwise, Incorporated. Care instructions adapted under license by [facility] ([facility]). If you have questions about a medical condition or this instruction, always ask your healthcare professional. [address] any warranty or liability for your use of this information.

## 2022-04-13 NOTE — PROGRESS NOTES
[facility]  [address] [facility] [place] [address]  Dept: [phone]  Dept Fax: [phone]: [phone]     Visit Date:  4/13/2022      Patient:  Liane Hernandez  YOB: 1970    HPI:     Chief Complaint   Patient presents with    Pharyngitis     C/O scratchy throat x 2 days, some soreness. Pt presents to the office today for cough and some drainage for the past 2 days. She is having surgery tomorrow on her left foot and she wanted to be sure nothing else was going on. COVID testing at home was negative, but she is still nervous. No fever or chills. No sick contacts, but pt does work with the public. She states her throat is not super painful, but more \"scratchy\". A little clear nasal drainage also. Pharyngitis  This is a new problem. The current episode started yesterday. Associated symptoms include fatigue. Pertinent negatives include no abdominal pain, anorexia, change in bowel habit, chest pain, chills, congestion, coughing, fever, headaches, nausea, neck pain, rash, sore throat, urinary symptoms, vertigo, vomiting or weakness. She has tried rest, sleep and drinking for the symptoms. The treatment provided moderate relief. Medications    Current Outpatient Medications:     hydrocortisone 2.5 % cream, Apply topically 2 times daily. (Patient taking differently: as needed Apply topically 2 times daily.), Disp: 28 g, Rfl: 1    amitriptyline (ELAVIL) 10 MG tablet, TAKE 1 TABLET BY MOUTH EVERY DAY, Disp: , Rfl:     diclofenac sodium (VOLTAREN) 1 % GEL, Apply topically 2 times daily, Disp: 150 g, Rfl: 5    The patient is allergic to oxycodone and shellfish allergy. Past Medical History  Jd  has a past medical history of COVID-19, History of blood transfusion, and Prolonged emergence from general anesthesia. Subjective:      Review of Systems   Constitutional: Positive for fatigue.  Negative for chills and fever. HENT: Negative for congestion, facial swelling, sinus pain, sore throat and trouble swallowing. Eyes: Negative for visual disturbance. Respiratory: Negative for cough, shortness of breath and wheezing. Cardiovascular: Negative for chest pain and palpitations. Gastrointestinal: Negative for abdominal pain, anorexia, change in bowel habit, diarrhea, nausea and vomiting. Musculoskeletal: Negative for back pain, gait problem and neck pain. Skin: Negative for color change and rash. Neurological: Negative for dizziness, vertigo, weakness and headaches. Psychiatric/Behavioral: Negative for agitation and sleep disturbance. The patient is not nervous/anxious. Objective:     /84   Pulse 80   Resp 16   Wt 206 lb (93.4 kg)   LMP 04/06/2022   BMI 35.36 kg/m²     Physical Exam  Vitals reviewed. Constitutional:       General: She is not in acute distress. Appearance: Normal appearance. She is well-developed. HENT:      Head: Normocephalic and atraumatic. Right Ear: Hearing, tympanic membrane, ear canal and external ear normal.      Left Ear: Hearing, tympanic membrane, ear canal and external ear normal.      Nose: Nose normal. No nasal tenderness, congestion or rhinorrhea. Mouth/Throat:      Lips: Pink. Mouth: Mucous membranes are moist. No oral lesions. Pharynx: Oropharynx is clear. Uvula midline. No oropharyngeal exudate or posterior oropharyngeal erythema. Eyes:      General:         Right eye: No discharge. Left eye: No discharge. Conjunctiva/sclera: Conjunctivae normal.   Neck:      Trachea: No tracheal deviation. Cardiovascular:      Rate and Rhythm: Normal rate and regular rhythm. Heart sounds: Normal heart sounds. No murmur heard. Pulmonary:      Effort: Pulmonary effort is normal. No respiratory distress. Breath sounds: Normal breath sounds.    Abdominal:      General: Bowel sounds are normal.      Palpations: Abdomen is soft. Tenderness: There is no abdominal tenderness. Musculoskeletal:      Cervical back: Full passive range of motion without pain, normal range of motion and neck supple. No tenderness. Lymphadenopathy:      Head:      Right side of head: No submental, submandibular, tonsillar, preauricular, posterior auricular or occipital adenopathy. Left side of head: No submental, submandibular, tonsillar, preauricular, posterior auricular or occipital adenopathy. Cervical: No cervical adenopathy. Skin:     General: Skin is warm and dry. Findings: No rash. Neurological:      General: No focal deficit present. Mental Status: She is alert and oriented to person, place, and time. Coordination: Coordination normal.   Psychiatric:         Mood and Affect: Mood normal.         Behavior: Behavior normal.         Thought Content: Thought content normal.         Judgment: Judgment normal.       Results for POC orders placed in visit on 04/13/22   POCT Influenza A/B   Result Value Ref Range    Influenza A Ab negative     Influenza B Ab negative    POCT rapid strep A   Result Value Ref Range    Strep A Ag None Detected None Detected       Assessment/Plan:      Jd was seen today for pharyngitis. Diagnoses and all orders for this visit:    Acute viral pharyngitis  -     POCT COVID-19 Rapid, NAAT  -     POCT Influenza A/B  -     POCT rapid strep A    Allergic rhinitis, unspecified seasonality, unspecified trigger    - Strep, flu and COVID testing negative in the office today. - Symptoms and assessment consistent with viral illness/allergies. - Pt to start taking Claritin daily, samples provided. - OK to proceed with surgery tomorrow. - Call office with any questions or concerns, or if symptoms are getting worse or changing    Return if symptoms worsen or fail to improve. Patient given educational materials - see patient instructions.   Discussed use, benefit, and side effects of prescribed medications. All patient questions answered. Pt voiced understanding.         Electronically signed by PHILL Noble CNP on 4/13/2022 at 1:47 PM

## 2022-04-14 ENCOUNTER — ANESTHESIA EVENT (OUTPATIENT)
Dept: OPERATING ROOM | Age: 52
End: 2022-04-14
Payer: COMMERCIAL

## 2022-04-14 ENCOUNTER — HOSPITAL ENCOUNTER (OUTPATIENT)
Age: 52
Setting detail: OUTPATIENT SURGERY
Discharge: HOME | End: 2022-04-14
Attending: PODIATRIST | Admitting: PODIATRIST
Payer: COMMERCIAL

## 2022-04-14 ENCOUNTER — ANESTHESIA (OUTPATIENT)
Dept: OPERATING ROOM | Age: 52
End: 2022-04-14
Payer: COMMERCIAL

## 2022-04-14 VITALS
WEIGHT: 206.6 LBS | DIASTOLIC BLOOD PRESSURE: 73 MMHG | BODY MASS INDEX: 35.27 KG/M2 | HEIGHT: 64 IN | OXYGEN SATURATION: 95 % | HEART RATE: 105 BPM | SYSTOLIC BLOOD PRESSURE: 139 MMHG | TEMPERATURE: 97.3 F | RESPIRATION RATE: 20 BRPM

## 2022-04-14 VITALS
OXYGEN SATURATION: 92 % | TEMPERATURE: 96.8 F | DIASTOLIC BLOOD PRESSURE: 57 MMHG | SYSTOLIC BLOOD PRESSURE: 104 MMHG | RESPIRATION RATE: 20 BRPM

## 2022-04-14 DIAGNOSIS — G89.18 POST-OP PAIN: Primary | ICD-10-CM

## 2022-04-14 DIAGNOSIS — G57.82 NEURITIS OF LEFT SURAL NERVE: ICD-10-CM

## 2022-04-14 LAB
GFR SERPL CREATININE-BSD FRML MDRD: > 90 ML/MIN/1.73M2
PREGNANCY, URINE: NEGATIVE

## 2022-04-14 PROCEDURE — 88342 IMHCHEM/IMCYTCHM 1ST ANTB: CPT

## 2022-04-14 PROCEDURE — 3700000000 HC ANESTHESIA ATTENDED CARE: Performed by: PODIATRIST

## 2022-04-14 PROCEDURE — 2709999900 HC NON-CHARGEABLE SUPPLY: Performed by: PODIATRIST

## 2022-04-14 PROCEDURE — 7100000010 HC PHASE II RECOVERY - FIRST 15 MIN: Performed by: PODIATRIST

## 2022-04-14 PROCEDURE — 2720000010 HC SURG SUPPLY STERILE: Performed by: PODIATRIST

## 2022-04-14 PROCEDURE — 7100000000 HC PACU RECOVERY - FIRST 15 MIN: Performed by: PODIATRIST

## 2022-04-14 PROCEDURE — 2580000003 HC RX 258: Performed by: STUDENT IN AN ORGANIZED HEALTH CARE EDUCATION/TRAINING PROGRAM

## 2022-04-14 PROCEDURE — C1889 IMPLANT/INSERT DEVICE, NOC: HCPCS | Performed by: PODIATRIST

## 2022-04-14 PROCEDURE — A4216 STERILE WATER/SALINE, 10 ML: HCPCS | Performed by: PODIATRIST

## 2022-04-14 PROCEDURE — 6360000002 HC RX W HCPCS: Performed by: STUDENT IN AN ORGANIZED HEALTH CARE EDUCATION/TRAINING PROGRAM

## 2022-04-14 PROCEDURE — 81025 URINE PREGNANCY TEST: CPT

## 2022-04-14 PROCEDURE — 2500000003 HC RX 250 WO HCPCS

## 2022-04-14 PROCEDURE — 3600000002 HC SURGERY LEVEL 2 BASE: Performed by: PODIATRIST

## 2022-04-14 PROCEDURE — C1763 CONN TISS, NON-HUMAN: HCPCS | Performed by: PODIATRIST

## 2022-04-14 PROCEDURE — 7100000001 HC PACU RECOVERY - ADDTL 15 MIN: Performed by: PODIATRIST

## 2022-04-14 PROCEDURE — 3600000012 HC SURGERY LEVEL 2 ADDTL 15MIN: Performed by: PODIATRIST

## 2022-04-14 PROCEDURE — 6360000002 HC RX W HCPCS

## 2022-04-14 PROCEDURE — 7100000011 HC PHASE II RECOVERY - ADDTL 15 MIN: Performed by: PODIATRIST

## 2022-04-14 PROCEDURE — 2500000003 HC RX 250 WO HCPCS: Performed by: PODIATRIST

## 2022-04-14 PROCEDURE — 88304 TISSUE EXAM BY PATHOLOGIST: CPT

## 2022-04-14 PROCEDURE — 2580000003 HC RX 258: Performed by: PODIATRIST

## 2022-04-14 PROCEDURE — C1762 CONN TISS, HUMAN(INC FASCIA): HCPCS | Performed by: PODIATRIST

## 2022-04-14 PROCEDURE — 88341 IMHCHEM/IMCYTCHM EA ADD ANTB: CPT

## 2022-04-14 PROCEDURE — 3700000001 HC ADD 15 MINUTES (ANESTHESIA): Performed by: PODIATRIST

## 2022-04-14 DEVICE — GRAFT HUM TISS W4XL3CM THK1MM UMB CRD AMNIO MEM CLARIX 1K: Type: IMPLANTABLE DEVICE | Site: OTHER | Status: FUNCTIONAL

## 2022-04-14 DEVICE — ALLOGRAFT HUM TISS 6X3 CM CRYOPRESERVED AMNIOX CLARIX CRD 1K: Type: IMPLANTABLE DEVICE | Site: OTHER | Status: FUNCTIONAL

## 2022-04-14 DEVICE — CONNECTOR NRV L15MM DIA4MM PORCINE EXTRACELLULAR MTRX: Type: IMPLANTABLE DEVICE | Site: OTHER | Status: FUNCTIONAL

## 2022-04-14 DEVICE — IMPLANTABLE DEVICE: Type: IMPLANTABLE DEVICE | Site: OTHER | Status: FUNCTIONAL

## 2022-04-14 DEVICE — ALLOGRAFT HUM TISS 100 MG UMB CRD CLARIX FLO: Type: IMPLANTABLE DEVICE | Site: OTHER | Status: FUNCTIONAL

## 2022-04-14 RX ORDER — SODIUM CHLORIDE 0.9 % (FLUSH) 0.9 %
5-40 SYRINGE (ML) INJECTION EVERY 12 HOURS SCHEDULED
Status: DISCONTINUED | OUTPATIENT
Start: 2022-04-14 | End: 2022-04-14 | Stop reason: HOSPADM

## 2022-04-14 RX ORDER — HYDRALAZINE HYDROCHLORIDE 20 MG/ML
10 INJECTION INTRAMUSCULAR; INTRAVENOUS
Status: DISCONTINUED | OUTPATIENT
Start: 2022-04-14 | End: 2022-04-14 | Stop reason: HOSPADM

## 2022-04-14 RX ORDER — ONDANSETRON 2 MG/ML
4 INJECTION INTRAMUSCULAR; INTRAVENOUS EVERY 6 HOURS PRN
Status: DISCONTINUED | OUTPATIENT
Start: 2022-04-14 | End: 2022-04-14 | Stop reason: HOSPADM

## 2022-04-14 RX ORDER — SUCCINYLCHOLINE/SOD CL,ISO/PF 200MG/10ML
SYRINGE (ML) INTRAVENOUS PRN
Status: DISCONTINUED | OUTPATIENT
Start: 2022-04-14 | End: 2022-04-14 | Stop reason: SDUPTHER

## 2022-04-14 RX ORDER — SODIUM CHLORIDE 0.9 % (FLUSH) 0.9 %
5-40 SYRINGE (ML) INJECTION PRN
Status: DISCONTINUED | OUTPATIENT
Start: 2022-04-14 | End: 2022-04-14 | Stop reason: HOSPADM

## 2022-04-14 RX ORDER — RIVAROXABAN 10 MG/1
10 TABLET, FILM COATED ORAL
Qty: 10 TABLET | Refills: 0 | Status: SHIPPED | OUTPATIENT
Start: 2022-04-14 | End: 2022-10-21

## 2022-04-14 RX ORDER — LABETALOL 20 MG/4 ML (5 MG/ML) INTRAVENOUS SYRINGE
10
Status: DISCONTINUED | OUTPATIENT
Start: 2022-04-14 | End: 2022-04-14 | Stop reason: HOSPADM

## 2022-04-14 RX ORDER — DIPHENHYDRAMINE HYDROCHLORIDE 50 MG/ML
12.5 INJECTION INTRAMUSCULAR; INTRAVENOUS
Status: DISCONTINUED | OUTPATIENT
Start: 2022-04-14 | End: 2022-04-14 | Stop reason: HOSPADM

## 2022-04-14 RX ORDER — FENTANYL CITRATE 50 UG/ML
50 INJECTION, SOLUTION INTRAMUSCULAR; INTRAVENOUS EVERY 5 MIN PRN
Status: DISCONTINUED | OUTPATIENT
Start: 2022-04-14 | End: 2022-04-14 | Stop reason: HOSPADM

## 2022-04-14 RX ORDER — DROPERIDOL 2.5 MG/ML
0.62 INJECTION, SOLUTION INTRAMUSCULAR; INTRAVENOUS
Status: DISCONTINUED | OUTPATIENT
Start: 2022-04-14 | End: 2022-04-14 | Stop reason: HOSPADM

## 2022-04-14 RX ORDER — ONDANSETRON 2 MG/ML
INJECTION INTRAMUSCULAR; INTRAVENOUS PRN
Status: DISCONTINUED | OUTPATIENT
Start: 2022-04-14 | End: 2022-04-14 | Stop reason: SDUPTHER

## 2022-04-14 RX ORDER — SODIUM CHLORIDE 9 MG/ML
INJECTION INTRAVENOUS PRN
Status: DISCONTINUED | OUTPATIENT
Start: 2022-04-14 | End: 2022-04-14 | Stop reason: ALTCHOICE

## 2022-04-14 RX ORDER — PHENYLEPHRINE HYDROCHLORIDE 10 MG/ML
INJECTION INTRAVENOUS PRN
Status: DISCONTINUED | OUTPATIENT
Start: 2022-04-14 | End: 2022-04-14 | Stop reason: SDUPTHER

## 2022-04-14 RX ORDER — PROPOFOL 10 MG/ML
INJECTION, EMULSION INTRAVENOUS PRN
Status: DISCONTINUED | OUTPATIENT
Start: 2022-04-14 | End: 2022-04-14 | Stop reason: SDUPTHER

## 2022-04-14 RX ORDER — HYDROCODONE BITARTRATE AND ACETAMINOPHEN 5; 325 MG/1; MG/1
1 TABLET ORAL EVERY 6 HOURS PRN
Qty: 28 TABLET | Refills: 0 | Status: SHIPPED | OUTPATIENT
Start: 2022-04-14 | End: 2022-04-21

## 2022-04-14 RX ORDER — LIDOCAINE HYDROCHLORIDE 10 MG/ML
INJECTION, SOLUTION INFILTRATION; PERINEURAL PRN
Status: DISCONTINUED | OUTPATIENT
Start: 2022-04-14 | End: 2022-04-14 | Stop reason: SDUPTHER

## 2022-04-14 RX ORDER — ONDANSETRON 4 MG/1
4 TABLET, ORALLY DISINTEGRATING ORAL EVERY 8 HOURS PRN
Status: DISCONTINUED | OUTPATIENT
Start: 2022-04-14 | End: 2022-04-14 | Stop reason: HOSPADM

## 2022-04-14 RX ORDER — MIDAZOLAM HYDROCHLORIDE 1 MG/ML
INJECTION INTRAMUSCULAR; INTRAVENOUS PRN
Status: DISCONTINUED | OUTPATIENT
Start: 2022-04-14 | End: 2022-04-14 | Stop reason: SDUPTHER

## 2022-04-14 RX ORDER — HYDROXYZINE PAMOATE 25 MG/1
25 CAPSULE ORAL 3 TIMES DAILY PRN
Qty: 28 CAPSULE | Refills: 0 | Status: SHIPPED | OUTPATIENT
Start: 2022-04-14 | End: 2022-04-21

## 2022-04-14 RX ORDER — SODIUM CHLORIDE 9 MG/ML
INJECTION, SOLUTION INTRAVENOUS CONTINUOUS
Status: DISCONTINUED | OUTPATIENT
Start: 2022-04-14 | End: 2022-04-14 | Stop reason: HOSPADM

## 2022-04-14 RX ORDER — MEPERIDINE HYDROCHLORIDE 25 MG/ML
12.5 INJECTION INTRAMUSCULAR; INTRAVENOUS; SUBCUTANEOUS EVERY 5 MIN PRN
Status: DISCONTINUED | OUTPATIENT
Start: 2022-04-14 | End: 2022-04-14 | Stop reason: HOSPADM

## 2022-04-14 RX ORDER — EPHEDRINE SULFATE/0.9% NACL/PF 50 MG/5 ML
SYRINGE (ML) INTRAVENOUS PRN
Status: DISCONTINUED | OUTPATIENT
Start: 2022-04-14 | End: 2022-04-14 | Stop reason: SDUPTHER

## 2022-04-14 RX ORDER — SODIUM CHLORIDE 9 MG/ML
25 INJECTION, SOLUTION INTRAVENOUS PRN
Status: DISCONTINUED | OUTPATIENT
Start: 2022-04-14 | End: 2022-04-14 | Stop reason: HOSPADM

## 2022-04-14 RX ORDER — ONDANSETRON 2 MG/ML
4 INJECTION INTRAMUSCULAR; INTRAVENOUS
Status: DISCONTINUED | OUTPATIENT
Start: 2022-04-14 | End: 2022-04-14 | Stop reason: HOSPADM

## 2022-04-14 RX ORDER — DEXAMETHASONE SODIUM PHOSPHATE 10 MG/ML
INJECTION, EMULSION INTRAMUSCULAR; INTRAVENOUS PRN
Status: DISCONTINUED | OUTPATIENT
Start: 2022-04-14 | End: 2022-04-14 | Stop reason: SDUPTHER

## 2022-04-14 RX ORDER — LORAZEPAM 2 MG/ML
0.5 INJECTION INTRAMUSCULAR
Status: DISCONTINUED | OUTPATIENT
Start: 2022-04-14 | End: 2022-04-14 | Stop reason: HOSPADM

## 2022-04-14 RX ORDER — BUPIVACAINE HYDROCHLORIDE 5 MG/ML
INJECTION, SOLUTION PERINEURAL PRN
Status: DISCONTINUED | OUTPATIENT
Start: 2022-04-14 | End: 2022-04-14 | Stop reason: ALTCHOICE

## 2022-04-14 RX ORDER — HYDROCODONE BITARTRATE AND ACETAMINOPHEN 5; 325 MG/1; MG/1
1 TABLET ORAL EVERY 6 HOURS PRN
Status: DISCONTINUED | OUTPATIENT
Start: 2022-04-14 | End: 2022-04-14 | Stop reason: HOSPADM

## 2022-04-14 RX ORDER — FENTANYL CITRATE 50 UG/ML
INJECTION, SOLUTION INTRAMUSCULAR; INTRAVENOUS PRN
Status: DISCONTINUED | OUTPATIENT
Start: 2022-04-14 | End: 2022-04-14 | Stop reason: SDUPTHER

## 2022-04-14 RX ORDER — LIDOCAINE HYDROCHLORIDE AND EPINEPHRINE 10; 10 MG/ML; UG/ML
INJECTION, SOLUTION INFILTRATION; PERINEURAL PRN
Status: DISCONTINUED | OUTPATIENT
Start: 2022-04-14 | End: 2022-04-14 | Stop reason: ALTCHOICE

## 2022-04-14 RX ORDER — IPRATROPIUM BROMIDE AND ALBUTEROL SULFATE 2.5; .5 MG/3ML; MG/3ML
1 SOLUTION RESPIRATORY (INHALATION)
Status: DISCONTINUED | OUTPATIENT
Start: 2022-04-14 | End: 2022-04-14 | Stop reason: HOSPADM

## 2022-04-14 RX ADMIN — Medication 120 MG: at 07:48

## 2022-04-14 RX ADMIN — PROPOFOL 100 MG: 10 INJECTION, EMULSION INTRAVENOUS at 07:48

## 2022-04-14 RX ADMIN — Medication 10 MG: at 10:43

## 2022-04-14 RX ADMIN — PHENYLEPHRINE HYDROCHLORIDE 100 MCG: 10 INJECTION INTRAVENOUS at 08:43

## 2022-04-14 RX ADMIN — PROPOFOL 200 MG: 10 INJECTION, EMULSION INTRAVENOUS at 07:42

## 2022-04-14 RX ADMIN — ONDANSETRON 4 MG: 2 INJECTION INTRAMUSCULAR; INTRAVENOUS at 07:45

## 2022-04-14 RX ADMIN — FENTANYL CITRATE 25 MCG: 50 INJECTION, SOLUTION INTRAMUSCULAR; INTRAVENOUS at 09:24

## 2022-04-14 RX ADMIN — PHENYLEPHRINE HYDROCHLORIDE 100 MCG: 10 INJECTION INTRAVENOUS at 08:04

## 2022-04-14 RX ADMIN — MIDAZOLAM 2 MG: 1 INJECTION INTRAMUSCULAR; INTRAVENOUS at 07:41

## 2022-04-14 RX ADMIN — Medication 5 MG: at 07:52

## 2022-04-14 RX ADMIN — FENTANYL CITRATE 25 MCG: 50 INJECTION, SOLUTION INTRAMUSCULAR; INTRAVENOUS at 09:33

## 2022-04-14 RX ADMIN — FENTANYL CITRATE 25 MCG: 50 INJECTION, SOLUTION INTRAMUSCULAR; INTRAVENOUS at 10:28

## 2022-04-14 RX ADMIN — PHENYLEPHRINE HYDROCHLORIDE 100 MCG: 10 INJECTION INTRAVENOUS at 10:41

## 2022-04-14 RX ADMIN — Medication 5 MG: at 08:45

## 2022-04-14 RX ADMIN — PHENYLEPHRINE HYDROCHLORIDE 100 MCG: 10 INJECTION INTRAVENOUS at 09:13

## 2022-04-14 RX ADMIN — Medication 5 MG: at 09:11

## 2022-04-14 RX ADMIN — LIDOCAINE HYDROCHLORIDE 90 MG: 10 INJECTION, SOLUTION INFILTRATION; PERINEURAL at 07:42

## 2022-04-14 RX ADMIN — Medication 2000 MG: at 11:44

## 2022-04-14 RX ADMIN — DEXAMETHASONE SODIUM PHOSPHATE 10 MG: 10 INJECTION, EMULSION INTRAMUSCULAR; INTRAVENOUS at 07:45

## 2022-04-14 RX ADMIN — Medication 2000 MG: at 07:44

## 2022-04-14 RX ADMIN — SODIUM CHLORIDE: 9 INJECTION, SOLUTION INTRAVENOUS at 07:37

## 2022-04-14 RX ADMIN — Medication 5 MG: at 09:40

## 2022-04-14 RX ADMIN — FENTANYL CITRATE 100 MCG: 50 INJECTION, SOLUTION INTRAMUSCULAR; INTRAVENOUS at 07:45

## 2022-04-14 RX ADMIN — Medication 10 MG: at 11:33

## 2022-04-14 RX ADMIN — Medication 10 MG: at 07:49

## 2022-04-14 RX ADMIN — SODIUM CHLORIDE: 9 INJECTION, SOLUTION INTRAVENOUS at 08:42

## 2022-04-14 RX ADMIN — FENTANYL CITRATE 25 MCG: 50 INJECTION, SOLUTION INTRAMUSCULAR; INTRAVENOUS at 10:46

## 2022-04-14 ASSESSMENT — PULMONARY FUNCTION TESTS
PIF_VALUE: 24
PIF_VALUE: 21
PIF_VALUE: 25
PIF_VALUE: 19
PIF_VALUE: 22
PIF_VALUE: 24
PIF_VALUE: 6
PIF_VALUE: 25
PIF_VALUE: 22
PIF_VALUE: 36
PIF_VALUE: 1
PIF_VALUE: 3
PIF_VALUE: 25
PIF_VALUE: 23
PIF_VALUE: 21
PIF_VALUE: 27
PIF_VALUE: 25
PIF_VALUE: 22
PIF_VALUE: 22
PIF_VALUE: 24
PIF_VALUE: 24
PIF_VALUE: 25
PIF_VALUE: 21
PIF_VALUE: 21
PIF_VALUE: 22
PIF_VALUE: 4
PIF_VALUE: 23
PIF_VALUE: 24
PIF_VALUE: 21
PIF_VALUE: 3
PIF_VALUE: 18
PIF_VALUE: 24
PIF_VALUE: 22
PIF_VALUE: 21
PIF_VALUE: 25
PIF_VALUE: 24
PIF_VALUE: 27
PIF_VALUE: 21
PIF_VALUE: 22
PIF_VALUE: 21
PIF_VALUE: 26
PIF_VALUE: 21
PIF_VALUE: 29
PIF_VALUE: 24
PIF_VALUE: 24
PIF_VALUE: 3
PIF_VALUE: 25
PIF_VALUE: 25
PIF_VALUE: 20
PIF_VALUE: 20
PIF_VALUE: 23
PIF_VALUE: 13
PIF_VALUE: 1
PIF_VALUE: 19
PIF_VALUE: 24
PIF_VALUE: 21
PIF_VALUE: 7
PIF_VALUE: 21
PIF_VALUE: 23
PIF_VALUE: 24
PIF_VALUE: 24
PIF_VALUE: 23
PIF_VALUE: 23
PIF_VALUE: 24
PIF_VALUE: 2
PIF_VALUE: 20
PIF_VALUE: 2
PIF_VALUE: 21
PIF_VALUE: 24
PIF_VALUE: 22
PIF_VALUE: 22
PIF_VALUE: 1
PIF_VALUE: 3
PIF_VALUE: 3
PIF_VALUE: 22
PIF_VALUE: 24
PIF_VALUE: 21
PIF_VALUE: 4
PIF_VALUE: 23
PIF_VALUE: 36
PIF_VALUE: 18
PIF_VALUE: 1
PIF_VALUE: 24
PIF_VALUE: 24
PIF_VALUE: 3
PIF_VALUE: 18
PIF_VALUE: 20
PIF_VALUE: 25
PIF_VALUE: 21
PIF_VALUE: 23
PIF_VALUE: 18
PIF_VALUE: 29
PIF_VALUE: 22
PIF_VALUE: 22
PIF_VALUE: 24
PIF_VALUE: 4
PIF_VALUE: 24
PIF_VALUE: 30
PIF_VALUE: 3
PIF_VALUE: 21
PIF_VALUE: 24
PIF_VALUE: 25
PIF_VALUE: 24
PIF_VALUE: 24
PIF_VALUE: 25
PIF_VALUE: 21
PIF_VALUE: 19
PIF_VALUE: 25
PIF_VALUE: 21
PIF_VALUE: 21
PIF_VALUE: 22
PIF_VALUE: 21
PIF_VALUE: 23
PIF_VALUE: 21
PIF_VALUE: 23
PIF_VALUE: 29
PIF_VALUE: 1
PIF_VALUE: 20
PIF_VALUE: 21
PIF_VALUE: 3
PIF_VALUE: 3
PIF_VALUE: 22
PIF_VALUE: 22
PIF_VALUE: 24
PIF_VALUE: 21
PIF_VALUE: 23
PIF_VALUE: 1
PIF_VALUE: 23
PIF_VALUE: 23
PIF_VALUE: 3
PIF_VALUE: 25
PIF_VALUE: 28
PIF_VALUE: 24
PIF_VALUE: 15
PIF_VALUE: 22
PIF_VALUE: 22
PIF_VALUE: 24
PIF_VALUE: 24
PIF_VALUE: 25
PIF_VALUE: 26
PIF_VALUE: 21
PIF_VALUE: 22
PIF_VALUE: 24
PIF_VALUE: 24
PIF_VALUE: 25
PIF_VALUE: 23
PIF_VALUE: 24
PIF_VALUE: 23
PIF_VALUE: 2
PIF_VALUE: 3
PIF_VALUE: 23
PIF_VALUE: 20
PIF_VALUE: 24
PIF_VALUE: 25
PIF_VALUE: 16
PIF_VALUE: 22
PIF_VALUE: 23
PIF_VALUE: 23
PIF_VALUE: 21
PIF_VALUE: 22
PIF_VALUE: 22
PIF_VALUE: 23
PIF_VALUE: 9
PIF_VALUE: 23
PIF_VALUE: 15
PIF_VALUE: 21
PIF_VALUE: 24
PIF_VALUE: 22
PIF_VALUE: 4
PIF_VALUE: 23
PIF_VALUE: 25
PIF_VALUE: 24
PIF_VALUE: 29
PIF_VALUE: 6
PIF_VALUE: 22
PIF_VALUE: 21
PIF_VALUE: 26
PIF_VALUE: 18
PIF_VALUE: 21
PIF_VALUE: 24
PIF_VALUE: 3
PIF_VALUE: 20
PIF_VALUE: 23
PIF_VALUE: 21
PIF_VALUE: 26
PIF_VALUE: 23
PIF_VALUE: 25
PIF_VALUE: 25
PIF_VALUE: 4
PIF_VALUE: 21
PIF_VALUE: 16
PIF_VALUE: 23
PIF_VALUE: 25
PIF_VALUE: 22
PIF_VALUE: 22
PIF_VALUE: 21
PIF_VALUE: 25
PIF_VALUE: 2
PIF_VALUE: 25
PIF_VALUE: 21
PIF_VALUE: 19
PIF_VALUE: 21
PIF_VALUE: 19
PIF_VALUE: 25
PIF_VALUE: 25
PIF_VALUE: 26
PIF_VALUE: 19
PIF_VALUE: 26
PIF_VALUE: 25
PIF_VALUE: 24
PIF_VALUE: 22
PIF_VALUE: 22
PIF_VALUE: 24
PIF_VALUE: 23
PIF_VALUE: 21
PIF_VALUE: 22
PIF_VALUE: 21
PIF_VALUE: 20
PIF_VALUE: 24
PIF_VALUE: 21
PIF_VALUE: 24
PIF_VALUE: 23
PIF_VALUE: 24
PIF_VALUE: 24
PIF_VALUE: 3
PIF_VALUE: 21
PIF_VALUE: 20
PIF_VALUE: 24
PIF_VALUE: 23
PIF_VALUE: 24
PIF_VALUE: 25
PIF_VALUE: 24
PIF_VALUE: 3
PIF_VALUE: 2
PIF_VALUE: 21
PIF_VALUE: 21
PIF_VALUE: 22
PIF_VALUE: 1
PIF_VALUE: 24
PIF_VALUE: 22
PIF_VALUE: 2
PIF_VALUE: 21
PIF_VALUE: 20
PIF_VALUE: 6
PIF_VALUE: 25
PIF_VALUE: 23
PIF_VALUE: 21
PIF_VALUE: 14
PIF_VALUE: 23
PIF_VALUE: 21
PIF_VALUE: 23
PIF_VALUE: 22
PIF_VALUE: 22
PIF_VALUE: 26
PIF_VALUE: 21
PIF_VALUE: 3
PIF_VALUE: 29
PIF_VALUE: 20
PIF_VALUE: 25
PIF_VALUE: 27
PIF_VALUE: 25
PIF_VALUE: 4
PIF_VALUE: 22
PIF_VALUE: 21
PIF_VALUE: 13
PIF_VALUE: 26
PIF_VALUE: 3
PIF_VALUE: 1
PIF_VALUE: 28
PIF_VALUE: 21
PIF_VALUE: 23
PIF_VALUE: 3
PIF_VALUE: 25
PIF_VALUE: 4
PIF_VALUE: 21
PIF_VALUE: 24

## 2022-04-14 ASSESSMENT — PAIN - FUNCTIONAL ASSESSMENT
PAIN_FUNCTIONAL_ASSESSMENT: 0-10
PAIN_FUNCTIONAL_ASSESSMENT: PREVENTS OR INTERFERES SOME ACTIVE ACTIVITIES AND ADLS

## 2022-04-14 ASSESSMENT — PAIN SCALES - GENERAL: PAINLEVEL_OUTOF10: 0

## 2022-04-14 ASSESSMENT — PAIN DESCRIPTION - DESCRIPTORS: DESCRIPTORS: CONSTANT;DISCOMFORT

## 2022-04-14 NOTE — BRIEF OP NOTE
Brief Postoperative Note      Patient: Shabnam Aguero  YOB: 1970  MRN: [id number]    Date of Procedure: 4/14/2022    Pre-Op Diagnosis: SURAL NEURITIS LEFT, ACHILLES TENDONITIS    Post-Op Diagnosis: Same       Procedure(s): ALL ON LEFT  EXCISION OF DISTAL SURAL NERVE NEUROMA  SURAL NERVE TARGETED MUSCLE REINNERVATION WITH CONDUIT AND ALLOGRAFT   ACHILLES TENDON DEBRIDEMENT  APPLICATION OF AMNIOTIC GRAFT    Surgeon(s):  Ariel Rodgers DPM    Assistant:  Resident: Fransico Martin DPM; Jhon Berman DPM and Elaina Giron MS3    Anesthesia: General    Estimated Blood Loss (mL): less than 100     Hemostasis: Left thigh tourniquet @ 300 mmHg    Sutures: 4-0 Prolene, 6-0 Prolene, 4-0 Monocryl and 8-0 Nylon    Injectables: 30 cc of 1/2% Marcaine Plain & 17 cc of 1% Lidocaine w/ Epi    Complications: None    Specimens:   ID Type Source Tests Collected by Time Destination   A : neuroma sural nerve left Tissue Leg SURGICAL PATHOLOGY Ariel Rodgers DPM 4/14/2022 1037        Implants:  Implant Name Type Inv.  Item Serial No.  Lot No. LRB No. Used Action   GRAFT NRV L70MM DIA2-3MM PERIPH RODGER CONN CBL PROC FOR RECON - [id number]  GRAFT NRV L70MM DIA2-3MM PERIPH RODGER CONN CBL PROC FOR RECON [id number] [employer]- [id number] Left 1 Implanted   CONNECTOR NRV L15MM DIA4MM PORCINE EXTRACELLULAR MTRX - [id number]  CONNECTOR NRV L15MM DIA4MM PORCINE EXTRACELLULAR MTRX  [employer]- [id number] Left 1 Implanted   GRAFT HUM TISS E6YH8TG THK1MM UMB CRD AMNIO MEM CLARIX 1K - [id number]  GRAFT HUM TISS [id number] THK1MM UMB CRD AMNIO MEM CLARIX 1K [id number] [employer]-  Left 1 Implanted   ALLOGRAFT HUM TISS 6X3 CM CRYOPRESERVED AMNIOX CLARIX CRD 1K - [id number]  ALLOGRAFT HUM TISS 6X3 CM CRYOPRESERVED AMNIOX CLARIX CRD 1K [id number] TISSUE TECH INC  Left 1 Implanted   ALLOGRAFT HUM TISS 100 MG UMB CRD CLARIX JOANNE - [id number]  ALLOGRAFT HUM TISS 100 MG UMB CRD CLARIX JOANNE [id number] [employer]  Left 1 Implanted         Drains: None    Findings: Same as pre-op diagnosis. Distal sural nerve neuroma discovered intraoperatively.     Electronically signed by Yessica Gutierrez DPM on 4/14/2022 at 12:17 PM

## 2022-04-14 NOTE — OP NOTE
Operative Note      Patient: Camacho Palacios  YOB: 1970  MRN: [id number]    Date of Procedure: 4/14/2022    Pre-Op Diagnosis: SURAL NEURITIS LEFT, ACHILLES TENDONITIS    Post-Op Diagnosis: Same         Procedure(s):  LEFT ACHILLES TENDONOTOMY, EXCISION NEUROMA  LEFT SURAL NERVE, LEFT SURAL NERVE RPNI TRANSFER WITH SUTURING TO NERVE ALLOGRAFT AND CONDUIT ASSIST TO SOLEUS PEDICLE, Amnionic grafting left leg. Surgeon(s):  Fara Russell DPM, FACFAS    Assistant:   Resident: Rich Crabtree DPM; Damaris Lewis DPM    Anesthesia: General    Estimated Blood Loss (mL): less than 50     Complications: Other: Laceration of the lesser saphenous vein occur and sutured with complete closure    Specimens:   ID Type Source Tests Collected by Time Destination   A : neuroma sural nerve left Tissue Leg SURGICAL PATHOLOGY Vandana Mckinley Rodgers DPM 4/14/2022 1037        Implants:  Implant Name Type Inv.  Item Serial No.  Lot No. LRB No. Used Action   GRAFT NRV L70MM DIA2-3MM PERIPH RODGER CONN CBL PROC FOR RECON - [id number]  GRAFT NRV L70MM DIA2-3MM PERIPH RODGER CONN CBL PROC FOR RECON [id number] [employer]-WD [id number] Left 1 Implanted   CONNECTOR NRV L15MM DIA4MM PORCINE EXTRACELLULAR MTRX - [id number]  CONNECTOR NRV L15MM DIA4MM PORCINE EXTRACELLULAR MTRX  [employer]-WD [id number] Left 1 Implanted   GRAFT HUM TISS T9MW4IG THK1MM UMB CRD AMNIO MEM CLARIX 1K - [id number]  GRAFT HUM TISS [id number] THK1MM UMB CRD AMNIO MEM CLARIX 1K [id number] [employer] MED[employer]-WD  Left 1 Implanted   ALLOGRAFT HUM TISS 6X3 CM CRYOPRESERVED AMNIOX CLARIX CRD 1K - [id number]  ALLOGRAFT HUM TISS 6X3 CM CRYOPRESERVED AMNIOX CLARIX CRD 1K [id number] TISSUE [employer] INC  Left 1 Implanted   ALLOGRAFT HUM TISS 100 MG UMB CRD CLARIX JOANNE - [id number]  ALLOGRAFT HUM TISS 100 MG UMB CRD CLARIX JOANNE [id number] TISSUE TECH INC  Left 1 Implanted         Drains: * No LDAs found *    Findings: Severe entrapment and scar tissue of the left sural nerve at the level of the ankle    Detailed Description of Procedure:   Patient brought into the OR and placed in a lateral position in order to expose the lateral and posterior aspect of the left ankle and leg. A thigh tourniquet was placed around the left extremity. The left extremity was scrubbed prepped and draped in the usual aseptic manner. Now at this time attention was placed to the lateral aspect where 10 cc of 0.5% Marcaine plain was infiltrated around the common peroneal nerve at the neck of the fibula and then an area block along the incision line of the lateral aspect of the leg over the sural nerve down to the ankle. The patient then had the thigh tourniquet placed at 300 mmHg of pressure and at this time attention placed to the lateral aspect where previous incision a incision was made over top of the existing incision on the lateral ankle and it was carried distal the incision was long at approximately 35 cm. The patient at this time had deep dissection carried down to the level of the subcutaneous space where fascia was identified further dissection to the lateral margin of the Achilles tendon and then deep to this where in the fascial layers the lesser saphenous vein was identified and then the sural nerve approximately 10 cm above the ankle joint was identified. As we followed the nerve distal I was able to appreciate that there was immediately at the level of the previous incision a tremendous amount of scar tissue and adhesions binding not only the nerve but also the lesser saphenous vein. So using careful dissection using the bipolar cauterization as well as sharp and blunt dissection we began removing the scar tissue away from the deep underlying healthy adipose and healthy tissue.   Please note that cutting through the scar tissue laceration in the lesser saphenous vein had occurred and so therefore this was then reapproximated and fixed using 6-0 Prolene in an interrupted simple fashion. Coagulation and reduction was complete and there was no bleeding. Now at this time attention with further dissection removing this large amount of scar tissue occurred we were able to identify the previous surgical anastomosis and neurological tissue that was buried deep into regards triangle. This was followed and dissected and anesthetized and then resected into the fat tissue and removed in toto. It was then followed distal where we were able to note the previous neurectomy and I was able to remove this in its entirety. Please note that then as this large amount of scar tissue approximately 6 cm in length and likely a neuroma in nature was then further removed and as we followed the sural nerve up to approximately the most proximal aspect of the distal one third of the leg we are able to anesthetize the nerve and then we did a transection of the nerve at this level. Now at this time as the transection was performed the thigh tourniquet was released and bleeding was well controlled. So at this time once the nerve had been cut we identified that the nerve was healthy and there was bleeding at the distal aspect of the nerve. So at this time further release of the sural nerve was performed to identify that there was no longer any further branches and as we did so we were able to free the nerve with the distal portion of about 10 cm. So now at this time a 70 mm 2 to 3 mm diameter graft allograft was removed from packaging hydrated and then a 15 mm 4 mm diameter conduit was removed from packaging and the allograft was placed into the conduit and using 8-0 nylon suture this was sutured into position then with a small clip the conduit was narrowed appropriately.   Now at this time the conduit and allograft was placed within the wound bed and under loupe magnification we were able to anesthetize our skews me anastomosis the native sural nerve inside the conduit within 1 mm from the allograft nerve inside the conduit this was performed with an 8-0 Prolene suture and the conduit was reduced in size with a vessel clip in order to tunnel and align the nerve fibers. At this time it was evaluated and there was excellent and secure approximation and so this was now secured into the deep subcutaneous tissue and now a fasciotomy was performed overlying the sural or excuse me the soleus muscle the soleus muscle belly was then identified and we were able to find a large pedicle of muscle that was freed and created that was vascularized this vascularized pedicle was now dissected and at this time the distal portion of the allograft was placed inside of the muscle pedicle and it was then sutured over the distal aspect of the nerve as the nerve was sutured through the epineurium into the muscle as well creating an RPNI (reinnervation peripheral nerve innervation) anastomosis transfer. The patient tolerated this very well and now amnionic's tissue was placed overlying this whole area in order to protect him to prevent inflammation and scarring. Following this then the wound was irrigated and the distal area where the scar tissue was located was now also inserted and sutured into position a 4 x 6 amnionic piece of tissue. This grafting again will help scarred scarring and inflammation and pain. Now at this time using TX ultrasound debridement tool with the watershed area that was causing pain of the Achilles tendon although it is felt that it possibly was nerve related the patient complaining of Achilles tendinitis a ultrasound debridement with the TX was performed into the Achilles tendon for approximately 4 minutes. This was visualized and there was excellent improvement of the Achilles tendon. Following this an injection of amnio flow into the Achilles tendon to again help pain scarring and inflammation. The patient tolerated this very well.     Wound was copiously irrigated with normal saline and the deep subcutaneous tissue was now approximated and fixed using four-point 0 Monocryl in a interrupted simple and horizontal fashion. Then the subcuticular tissue was approximated and fixed using four-point 0 Monocryl in a running subcuticular fashion and then the skin was approximated and fixed using a zip tie wound closure device. The patient had Mepitel AG applied to the incision 4 x 4's ABD Kerlix and an Ace wrap to complete a compressive dressing. She will be immobilized nonweightbearing in a boot and was given Xarelto for anticoagulation Norco and Vistaril for pain. She will follow-up with me in the office on Monday elevate and instructions were given.         Electronically signed by Tj Walters DPM, Jaclyn Vides on 4/14/2022 at 2:14 PM

## 2022-04-14 NOTE — PROGRESS NOTES
1220: patient arrived to pacu via bed, Dr. Ne Jacobson and OR RN at bedside, report received, attached to monitor, vital signs stable. Assessment completed. Left foot toes pink, warm, capillary refill less than 3 seconds. Oxygen placed on patient at 4L. Iv infusing via gravity. 1225: patient continues to rest, eyes closed. Vital signs remain stable. RN remains at bedside. 1230: patient remains comfortable, resting with eyes closed. Vital signs remain stable. Oxygen at 2L NC.     1235: patient opening eyes, nodding yes/no appropriately. 1240: patient continues to rest, respirations easy and unlabored, vital signs remain stable. 1245: patient continues to rest, eyes closed. Oxygen removed. 1250: patient awake and answering questions. Denies needs. 1255: Patient meets requirements to move to phase 2, patient transported back to room, friend at bedside, call light within reach, snack and drink provided. 1315: patient resting in bed with family at bedside. 1330: patient denies needs, continues to rest.     1345: patient resting in bed. Denies needs. 1400: patient resting, family at bedside, drinking some juice. 1415: patient voided on bedpan. 1430: patient getting dressed, IV removed. 1440: Discharge instructions reviewed with patient and family member. All questions were addressed and answered. Patient and family member verbalized understanding of discharge plan. 1450: patient wheeled to discharge lobby in stable condition. patient discharged home with family.

## 2022-04-14 NOTE — ANESTHESIA PRE PROCEDURE
Department of Anesthesiology  Preprocedure Note       Name:  Abhi Mark   Age:  54 y.o.  :  1970                                          MRN:  [id number]         Date:  2022      Surgeon: Octavia Gaviria):  Jose Rodgers DPM    Procedure: Procedure(s):  LEFT SURAL NERVE TARGETED MUSCLE REINNERVATION WITH ALLOGRAFT, TENEX ACHILLES TENDON    Medications prior to admission:   Prior to Admission medications    Medication Sig Start Date End Date Taking? Authorizing Provider   hydrOXYzine (VISTARIL) 25 MG capsule Take 1 capsule by mouth 3 times daily as needed for Itching 22 Yes Jose Rodgers DPM   HYDROcodone-acetaminophen (NORCO) 5-325 MG per tablet Take 1 tablet by mouth every 6 hours as needed for Pain for up to 7 days. Intended supply: 7 days. Take lowest dose possible to manage pain 22 Yes Jose Rodgers DPM   amitriptyline (ELAVIL) 10 MG tablet TAKE 1 TABLET BY MOUTH EVERY DAY 22   Historical Provider, MD   diclofenac sodium (VOLTAREN) 1 % GEL Apply topically 2 times daily 22   Yoselin Rivera, APRN - CNP       Current medications:    Current Facility-Administered Medications   Medication Dose Route Frequency Provider Last Rate Last Admin    0.9 % sodium chloride infusion   IntraVENous Continuous Faye Quique, DPM        sodium chloride flush 0.9 % injection 5-40 mL  5-40 mL IntraVENous 2 times per day Faye Quique, DPM        sodium chloride flush 0.9 % injection 5-40 mL  5-40 mL IntraVENous PRN Faye Quique, DPM        0.9 % sodium chloride infusion  25 mL IntraVENous PRN Faye Quique, DPM        ceFAZolin (ANCEF) 2000 mg in sterile water 20 mL IV syringe  2,000 mg IntraVENous On Call to 1 [facility], [facility]           Allergies:     Allergies   Allergen Reactions    Oxycodone Itching    Shellfish Allergy Anaphylaxis and Itching       Problem List:    Patient Active Problem List   Diagnosis Code    Multiple thyroid nodules E04.2  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    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       Past Medical History:        Diagnosis Date    COVID-19 01/2022    History of blood transfusion 2018    anemic due to heavy menses    Prolonged emergence from general anesthesia        Past Surgical History:        Procedure Laterality Date    ANTERIOR CRUCIATE LIGAMENT REPAIR Left 2006   Juany Pereira 59    FOOT SURGERY Left 01/2020    Flat foot reconstruction    NERVE GRAFT Left 08/2021    Left foot nerve graft    OTHER SURGICAL HISTORY  2019    Ablation surgery    SKIN BIOPSY      TONSILLECTOMY      as an adult       Social History:    Social History     Tobacco Use    Smoking status: Never Smoker    Smokeless tobacco: Never Used   Substance Use Topics    Alcohol use: Never                                Counseling given: Not Answered      Vital Signs (Current):   Vitals:    04/07/22 1019 04/14/22 0655   BP:  (!) 143/74   Pulse:  82   Resp:  16   Temp:  98.5 °F (36.9 °C)   TempSrc:  Temporal   SpO2:  96%   Weight: 207 lb (93.9 kg) 206 lb 9.6 oz (93.7 kg)   Height: 5' 4\" (1.626 m) 5' 4\" (1.626 m)                                              BP Readings from Last 3 Encounters:   04/14/22 (!) 143/74   04/14/22 (!) 115/58   04/13/22 122/84       NPO Status: Time of last liquid consumption: 2345                        Time of last solid consumption: 2345                        Date of last liquid consumption: 04/13/22                        Date of last solid food consumption: 04/13/22    BMI:   Wt Readings from Last 3 Encounters:   04/14/22 206 lb 9.6 oz (93.7 kg)   04/13/22 206 lb (93.4 kg)   04/04/22 207 lb 9.6 oz (94.2 kg)     Body mass index is 35.46 kg/m².     CBC:   Lab Results   Component Value Date    WBC 6.7 04/06/2022    RBC 4.28 04/06/2022    HGB 12.7 04/06/2022    HCT 39.1 04/06/2022    MCV 91.4 04/06/2022     04/06/2022       CMP:   Lab Results   Component Value Date     04/06/2022    K 3.7 04/06/2022  04/06/2022    CO2 24 04/06/2022    BUN 8 04/06/2022    CREATININE 0.7 04/06/2022    LABGLOM >90 04/06/2022    GLUCOSE 146 04/06/2022    CALCIUM 9.4 04/06/2022       POC Tests: No results for input(s): POCGLU, POCNA, POCK, POCCL, POCBUN, POCHEMO, POCHCT in the last 72 hours. Coags: No results found for: PROTIME, INR, APTT    HCG (If Applicable):   Lab Results   Component Value Date    PREGTESTUR negative 04/14/2022        ABGs: No results found for: PHART, PO2ART, NDX0DSC, DAO0YAK, BEART, B8JKWOWP     Type & Screen (If Applicable):  No results found for: LABABO, LABRH    Drug/Infectious Status (If Applicable):  No results found for: HIV, HEPCAB    COVID-19 Screening (If Applicable):   Lab Results   Component Value Date    COVID19 Negative 04/13/2022           Anesthesia Evaluation    Airway: Mallampati: II        Dental:          Pulmonary:       (-) COPD                           Cardiovascular:                      Neuro/Psych:               GI/Hepatic/Renal:             Endo/Other:                     Abdominal:             Vascular: Other Findings:             Anesthesia Plan      general     ASA 2             Anesthetic plan and risks discussed with patient.                       Isidro Redd MD   4/14/2022

## 2022-04-14 NOTE — ANESTHESIA POSTPROCEDURE EVALUATION
Department of Anesthesiology  Postprocedure Note    Patient: Mino Head  MRN: [id number]  YOB: 1970  Date of evaluation: 4/14/2022  Time:  3:22 PM     Procedure Summary     Date: 04/14/22 Room / Location: [address] [place] [address] 01 / [facility]    Anesthesia Start: 9740 Anesthesia Stop: 6477    Procedure: LEFT SURAL NERVE TARGETED MUSCLE REINNERVATION WITH ALLOGRAFT, ACHILLES TENDONOTOMY, EXCISION NEUROMA  LEFT SURAL NERVE, LEFT SURAL NERVE RPNI TRANSFER TO SOLEUS PEDICLE (Left ) Diagnosis: (SURAL NEURITIS LEFT, ACHILLES TENDONITIS)    Surgeons: Demetria Hernandez DPM Responsible Provider: Trisha Aguilar MD    Anesthesia Type: general ASA Status: 2          Anesthesia Type: general    Zita Phase I: Zita Score: 10    Zita Phase II: Zita Score: 10    Last vitals: Reviewed and per EMR flowsheets.        Anesthesia Post Evaluation    Complications: no

## 2022-07-14 ENCOUNTER — HOSPITAL ENCOUNTER (OUTPATIENT)
Dept: PHYSICAL THERAPY | Age: 52
Setting detail: THERAPIES SERIES
Discharge: HOME | End: 2022-07-14
Payer: COMMERCIAL

## 2022-07-14 PROCEDURE — 97162 PT EVAL MOD COMPLEX 30 MIN: CPT

## 2022-07-14 PROCEDURE — 97110 THERAPEUTIC EXERCISES: CPT

## 2022-07-14 NOTE — PROGRESS NOTES
** PLEASE PLEASE SIGN, DATE AND TIME CERTIFICATION BELOW AND RETURN TO [facility] OUTPATIENT REHABILITATION (FAX #: [phone]). ATTEST/CO-SIGN IF ACCESSING [facility]. THANK YOU.**    I certify that I have examined the patient below and determined that Physical Medicine and Rehabilitation service is necessary and that I approve the established plan of care for up to 90 days or as specifically noted.   Attestation, signature or co-signature of physician indicates approval of certification requirements.    ________________________ ____________ __________  Physician Signature   Date   Time  [address] [facility]  PHYSICAL THERAPY  [x] EVALUATION  [] DAILY NOTE (LAND) [] DAILY NOTE (AQUATIC ) [] PROGRESS NOTE [] DISCHARGE NOTE    [x] [address] [facility]   [] [place] [address]    [] [facility]   [] [facility]en Shearing    Date: 2022  Patient Name:  Mik Noriega  : 1970  MRN: [id number]  CSN: [id number]    Referring Practitioner Vishal, Reginald Arguelles DPM   Diagnosis CHILLES TENDONITIS LEFT FOOT    Treatment Diagnosis Foot pain, ROM deficits, gait deviations, balance deficts   Date of Evaluation 22    Additional Pertinent History Reconstructive surgery on foot, reinnervation procedures x2      Functional Outcome Measure Used LEFS   Functional Outcome Score 30/80 (22)       Insurance: Primary: Payor: Amie Del Rio /  /  / ,   Secondary:    Authorization Information: OUTPATIENT BENEFITS:               DEDUCTIBLE: $MET                           OUT OF POCKET: $MET               INSURANCE PAYS AT: 100%               PATIENT RESPONSIBILITY AND/OR CO-PAY: NA   SECONDARY INSURANCE COMPANY:  NONE       PRE CERTIFICATION REQUIRED: NO   INSURANCE THERAPY BENEFIT:  NO VISIT LIMIT   AQUATIC THERAPY COVERED: YES  MODALITIES COVERED:  YES    Visit # , 1/10 for progress note   Visits Allowed: No limit   Recertification Date: 84   Physician Follow-Up: About 4 weeks (approx. Week of August 7)   Physician Orders: 2-3x/week for 6 weeks   History of Present Illness: Patient reports ankle sprain about 7 years ago. She received therapy at that time but was not successful. She saw ortho for consult and imaging revealed ligament tear. She continued therapy with moderate success. Patient underwent \"flat foot\" surgery in January 2020. 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 Patient had nerve reconstruction/reinnervation in August of 2021. Her most recent surgery to reroute the nerve in April of 2022. She reports coming off crutches about 1 week ago. She also was allowed to come out of walking boot. Patient reports putting on gym shoes about 1 week ago. SUBJECTIVE: See above    Social/Functional History and Current Status:  Medications and Allergies have been reviewed and are listed on Medical History Questionnaire. Jessika Portillo lives alone in a single story home with a level surface to enter. Task Previous Current   ADLs  Independent Independent   IADL's Independent Modified Independent   Ambulation Independent Modified Independent   Transfers Independent Independent   Recreation Independent Dependent/Unable   Community Integration Independent Independent   Driving Active  Active    Work [employer]. Occupation: Business  Full-Time.   Limited overall efficacy due to limited standing time       Objective:    GENERAL   Pain 6/10   Observation Inflammed/swollen Left ankle   Palpation Tenderness along medial and lateral scars   Sensation    Edema Note in posterior aspect of Left knee   Accessory Motion        LOWER EXTREMITY RANGE OF MOTION    Left Right Comments   Knee Flexion      Knee Extension      Knee Range of Motion is [facility]  [x]      Ankle Plantarflexion 18 48    Ankle Dorsiflexion 2 16    Ankle Inversion 16 40    Ankle Eversion 18 22    Toe Flexion      Toe Extension      Ankle Range of Motion is [facility]  [] Toe Range of Motion is [facility]  []       LOWER EXTREMITY STRENGTH    Left Right Comments   Hip Flexion      Hip Abduction      Hip Extension      Knee Flexion      Knee Extension      Ankle DF      Ankle PF      Ankle Inversion      Ankle Eversion      Toe Flexion      Toe Extension      LE strength is [facility]  [x]              GAIT ANALYSIS:Flat foot, reduced stance time on Left foot, reduced toe off, slower isela, limited arm swing, poor stability during stance phase    BALANCE/PROPRIOCEPTION          Single leg stance                                          Left Right Pain/Comments   Eyes open                                    0  Sec        30+ Sec    Eyes closed                                                 0  Sec        10+ Sec        FUNCTIONAL TESTS    Pain No Pain Comments   Stair navigation X  Limited dorsiflexion on right descent   Squat      Single Leg Hop      Single Leg Heel Raise          TREATMENT   Precautions: Fusion of lateral arch of foot   Pain:     X in shaded column indicates activity completed today   Modalities Parameters/  Location  Notes                     Manual Therapy Time/Technique  Notes                     Exercise/Intervention   Notes          Calf stretch 3x 30  X    Ankle pumps 10x  X    Ankle cirlces 10x each direction  X                                                       Specific Interventions Next Treatment: Ultrasound, massage, stretching/ROM, strengthening, gait training with shoes, balance    Activity/Treatment Tolerance:  [x]  Patient tolerated treatment well  []  Patient limited by fatigue  []  Patient limited by pain   []  Patient limited by medical complications  []  Other:     Assessment: Patient presents after complex surgical history that includes fixation/build up of arch as well as multiple nerve reinnveration surgeries.  Patient reports ongoing burning sensation, limited ROM, gait deviations, and general intolerance to standing for prolonged periods of time that limit her day to day activities, particularly associated with work tasks. She states that she has generally been able to manage but expresses desire to return to prior level of function. She is very hopeful to have successful recovery after 2x \"failed\" surgeries, in her perspective. Patient is currently coming of use of crutches and walking boot at this time. Body Structures/Functions/Activity Limitations: edema, impaired activity tolerance, impaired balance, impaired ROM, impaired strength, pain, abnormal gait and abnormal posture  Prognosis: good    GOALS:  Patient Goal: To be normal    Short Term Goals: 3 weeks  -Patient will have greater than or equal to 8 degrees of ankle dorsiflexion in Left ankle/foot in order to improve functional use of foot.  -Patient will have greater than or equal to 5 seconds of unsupported standing on Left foot to improve stability.  -Patient will report no tenderness to palpation along Achilles tendon to allow for improved comfort wearing shoes. Long Term Goals: 6 weeks  -Patient will report less than or equal to 4/10 in foot while weight bearing in order to improve gait efficiency.  -Patient will report increased quality of life as evidenced by LEFS score greater than or equal to 50/80.  -Patient will safely negotiate stairs using reciprocal pattern and no UE support in order to increase community mobility.  -Patient will ambulate with greater than or equal to 150 feet with proper footwear and no signs of antalgia. Patient Education:   [x]  HEP/Education Completed: Plan of Care, Goals, Initial HEP   [employer] Access Code:  []  No new Education completed  []  Reviewed Prior HEP      [x]  Patient verbalized and/or demonstrated understanding of education provided. []  Patient unable to verbalize and/or demonstrate understanding of education provided. Will continue education.   []  Barriers to learning:     PLAN:  Treatment Recommendations: Strengthening, Range of Motion, Balance Training, Gait Training, Manual Therapy - Soft Tissue Mobilization, Pain Management, Home Exercise Program, Patient Education, Aquatics and Modalities    [x]  Plan of care initiated. Plan to see patient 2-3 times per week for 6 weeks to address the treatment planned outlined above.   []  Continue with current plan of care  []  Modify plan of care as follows:    []  Hold pending physician visit  []  Discharge    Time In 1500   Time Out 1600   Timed Code Minutes: 10 min   Total Treatment Time: 50 min     Electronically Signed by: Teodoro Contreras PT

## 2022-07-27 ENCOUNTER — HOSPITAL ENCOUNTER (OUTPATIENT)
Dept: PHYSICAL THERAPY | Age: 52
Setting detail: THERAPIES SERIES
Discharge: HOME | End: 2022-07-27
Payer: COMMERCIAL

## 2022-07-27 PROCEDURE — 97110 THERAPEUTIC EXERCISES: CPT

## 2022-07-27 NOTE — PROGRESS NOTES
[address] [facility]  PHYSICAL THERAPY  [] EVALUATION  [x] DAILY NOTE (LAND) [] DAILY NOTE (AQUATIC ) [] PROGRESS NOTE [] DISCHARGE NOTE    [x] [facility] [place]   [] [place] [address]    [] [facility]   [] [facility]    Date: 2022  Patient Name:  José Miguel Griffith  : 1970  MRN: [id number]  CSN: [id number]    Referring Practitioner Rodgers, Airam Gregorio DPM   Diagnosis CHILLES TENDONITIS LEFT FOOT    Treatment Diagnosis Foot pain, ROM deficits, gait deviations, balance deficts   Date of Evaluation 22    Additional Pertinent History Reconstructive surgery on foot, reinnervation procedures x2      Functional Outcome Measure Used LEFS   Functional Outcome Score 30/80 (22)       Insurance: Primary: Payor: Alexia Holland /  /  / ,   Secondary:    Authorization Information: OUTPATIENT BENEFITS:               DEDUCTIBLE: $MET                           OUT OF POCKET: $MET               INSURANCE PAYS AT: 100%               PATIENT RESPONSIBILITY AND/OR CO-PAY: NA   SECONDARY INSURANCE COMPANY:  NONE       PRE CERTIFICATION REQUIRED: NO   INSURANCE THERAPY BENEFIT:  NO VISIT LIMIT   AQUATIC THERAPY COVERED: YES  MODALITIES COVERED:  YES    Visit # 2, 2/10 for progress note   Visits Allowed: No limit   Recertification Date: 84   Physician Follow-Up: About 4 weeks (approx. Week of )   Physician Orders: 2-3x/week for 6 weeks   History of Present Illness: Patient reports ankle sprain about 7 years ago. She received therapy at that time but was not successful. She saw ortho for consult and imaging revealed ligament tear. She continued therapy with moderate success. Patient underwent \"flat foot\" surgery in 2020. 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 Patient had nerve reconstruction/reinnervation in 2021. Her most recent surgery to reroute the nerve in 2022.  She reports coming off crutches about 1 week ago. She also was allowed to come out of walking boot. Patient reports putting on gym shoes about 1 week ago. SUBJECTIVE: Patient arrives with 7/10 L foot/ankle pain as she has been at work. Pt states with rest and ice pain does improve. TREATMENT   Precautions: Fusion of lateral arch of foot   Pain: 7/10 L foot/ankle    X in shaded column indicates activity completed today   Modalities Parameters/  Location  Notes                     Manual Therapy Time/Technique  Notes                     Exercise/Intervention   Notes   Seated:       Calf stretch with strap 3x 30  X    Wooden Baps*: Fwd, Lat, Circles 10x  x    Inv/Ev with washcloth on slide board* 10x  x    Ankle pumps 10x  X    Ankle cirlces 10x each              Standing:       Step Stretches*: Calf stretch, L DF stretch 3x30\"  x    Rhomberg on airex* EO/EC 2x20\"  x No UE support   Modified Tandem on airex* EO/EC 2x20\"  x Frequently reaching out ot bars for support           Gait in // Bars*: Retro, Side Stepping 3 laps  x           Gait training in gym: 1 lap around gym  x Cues provided for improved weight shifting to L and increased L heel strike. Pt walks with a slightly widened stance for stability. Specific Interventions Next Treatment: Ultrasound, massage, stretching/ROM, strengthening, gait training with shoes, balance    Activity/Treatment Tolerance:  [x]  Patient tolerated treatment well  []  Patient limited by fatigue  []  Patient limited by pain   []  Patient limited by medical complications  []  Other:     Assessment: Addition of stretching and strengthening exercises as noted above. Pt noting no increase in pain with any new exercises today. Provided pt with step stretch handout to continue at home.      GOALS:  Patient Goal: To be normal    Short Term Goals: 3 weeks  -Patient will have greater than or equal to 8 degrees of ankle dorsiflexion in Left ankle/foot in order to improve functional use of foot.  -Patient will have greater than or equal to 5 seconds of unsupported standing on Left foot to improve stability.  -Patient will report no tenderness to palpation along Achilles tendon to allow for improved comfort wearing shoes. Long Term Goals: 6 weeks  -Patient will report less than or equal to 4/10 in foot while weight bearing in order to improve gait efficiency.  -Patient will report increased quality of life as evidenced by LEFS score greater than or equal to 50/80.  -Patient will safely negotiate stairs using reciprocal pattern and no UE support in order to increase community mobility.  -Patient will ambulate with greater than or equal to 150 feet with proper footwear and no signs of antalgia. Patient Education:   [x]  HEP/Education Completed: Ice for pain relief, step stretches at home  Cynthia Orosco 473:  []  No new Education completed  []  Reviewed Prior HEP      [x]  Patient verbalized and/or demonstrated understanding of education provided. []  Patient unable to verbalize and/or demonstrate understanding of education provided. Will continue education. []  Barriers to learning:     PLAN:  Treatment Recommendations: Strengthening, Range of Motion, Balance Training, Gait Training, Manual Therapy - Soft Tissue Mobilization, Pain Management, Home Exercise Program, Patient Education, Aquatics and Modalities    []  Plan of care initiated. Plan to see patient 2-3 times per week for 6 weeks to address the treatment planned outlined above.   [x]  Continue with current plan of care  []  Modify plan of care as follows:    []  Hold pending physician visit  []  Discharge    Time In 1337   Time Out 1415   Timed Code Minutes: 38 min   Total Treatment Time: 38 min     Electronically Signed by: Marilynn Grace PTA

## 2022-08-03 ENCOUNTER — HOSPITAL ENCOUNTER (OUTPATIENT)
Dept: PHYSICAL THERAPY | Age: 52
Setting detail: THERAPIES SERIES
Discharge: HOME | End: 2022-08-03
Payer: COMMERCIAL

## 2022-08-03 PROCEDURE — 97110 THERAPEUTIC EXERCISES: CPT

## 2022-08-03 NOTE — PROGRESS NOTES
[address] [facility]  PHYSICAL THERAPY  [] EVALUATION  [x] DAILY NOTE (LAND) [] DAILY NOTE (AQUATIC ) [] PROGRESS NOTE [] DISCHARGE NOTE    [x] OUTPATIENT REHABILITATION CENTER [facility]   [] [place] [address]    [] [facility][1]   [] [facility]    Date: 8/3/2022  Patient Name:  Braxton Hobson  : 1970  MRN: [id number]  CSN: [id number]    Referring Practitioner Rodgers, Brandy Benitez DPM   Diagnosis CHILLES TENDONITIS LEFT FOOT    Treatment Diagnosis Foot pain, ROM deficits, gait deviations, balance deficits   Date of Evaluation 22    Additional Pertinent History Reconstructive surgery on foot, reinnervation procedures x2      Functional Outcome Measure Used LEFS   Functional Outcome Score 30/80 (22)       Insurance: Primary: Payor: Francesca Lozada /  /  / ,   Secondary:    Authorization Information: OUTPATIENT BENEFITS:               DEDUCTIBLE: $MET                           OUT OF POCKET: $MET               INSURANCE PAYS AT: 100%               PATIENT RESPONSIBILITY AND/OR CO-PAY: NA   SECONDARY INSURANCE COMPANY:  NONE       PRE CERTIFICATION REQUIRED: NO   INSURANCE THERAPY BENEFIT:  NO VISIT LIMIT   AQUATIC THERAPY COVERED: YES  MODALITIES COVERED:  YES    Visit # 3, 3/10 for progress note   Visits Allowed: No limit   Recertification Date: 1/1/10   Physician Follow-Up: About 4 weeks (approx. Week of )   Physician Orders: 2-3x/week for 6 weeks   History of Present Illness: Patient reports ankle sprain about 7 years ago. She received therapy at that time but was not successful. She saw ortho for consult and imaging revealed ligament tear. She continued therapy with moderate success. Patient underwent \"flat foot\" surgery in 2020. 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 Patient had nerve reconstruction/reinnervation in 2021. Her most recent surgery to reroute the nerve in 2022.  She reports in order to improve functional use of foot.  -Patient will have greater than or equal to 5 seconds of unsupported standing on Left foot to improve stability.  -Patient will report no tenderness to palpation along Achilles tendon to allow for improved comfort wearing shoes. Long Term Goals: 6 weeks  -Patient will report less than or equal to 4/10 in foot while weight bearing in order to improve gait efficiency.  -Patient will report increased quality of life as evidenced by LEFS score greater than or equal to 50/80.  -Patient will safely negotiate stairs using reciprocal pattern and no UE support in order to increase community mobility.  -Patient will ambulate with greater than or equal to 150 feet with proper footwear and no signs of antalgia. Patient Education:   [x]  HEP/Education Completed: Ice for pain relief. 4 way ankle with peach band given. [employer] Access Code:[email or web address]  []  No new Education completed  []  Reviewed Prior HEP      [x]  Patient verbalized and/or demonstrated understanding of education provided. []  Patient unable to verbalize and/or demonstrate understanding of education provided. Will continue education. []  Barriers to learning:     PLAN:  Treatment Recommendations: Strengthening, Range of Motion, Balance Training, Gait Training, Manual Therapy - Soft Tissue Mobilization, Pain Management, Home Exercise Program, Patient Education, Aquatics and Modalities     Plan to see patient 2-3 times per week for 6 weeks to address the treatment planned outlined above.   [x]  Continue with current plan of care  []  Modify plan of care as follows:    []  Hold pending physician visit  []  Discharge    Time In 1356   Time Out 1427   Timed Code Minutes: 32 min   Total Treatment Time: 32 min     Electronically Signed by: Opal Hernandez PTA

## 2022-08-08 ENCOUNTER — HOSPITAL ENCOUNTER (OUTPATIENT)
Dept: PHYSICAL THERAPY | Age: 52
Setting detail: THERAPIES SERIES
Discharge: HOME | End: 2022-08-08
Payer: COMMERCIAL

## 2022-08-08 PROCEDURE — 97140 MANUAL THERAPY 1/> REGIONS: CPT

## 2022-08-08 PROCEDURE — 97110 THERAPEUTIC EXERCISES: CPT

## 2022-08-08 NOTE — PROGRESS NOTES
[address] [facility]  PHYSICAL THERAPY  [x] DAILY NOTE (LAND) [] DAILY NOTE (AQUATIC ) [] PROGRESS NOTE [] DISCHARGE NOTE    [x] OUTPATIENT REHABILITATION CENTER [facility]   [] [place] [address]    [] [facility]   [] Rubio Amaro    Date: 2022  Patient Name:  Ja Barnes  : 1970  MRN: [id number]  CSN: [id number]    Referring Practitioner Rodgers, Kourtney Estevez DPM   Diagnosis CHILLES TENDONITIS LEFT FOOT    Treatment Diagnosis Foot pain, ROM deficits, gait deviations, balance deficits   Date of Evaluation 22    Additional Pertinent History Reconstructive surgery on foot, reinnervation procedures x2      Functional Outcome Measure Used LEFS   Functional Outcome Score 30/80 (22)       Insurance: Primary: Payor: Guera Garcia /  /  / ,   Secondary:    Authorization Information: OUTPATIENT BENEFITS:               DEDUCTIBLE: $MET                           OUT OF POCKET: $MET               INSURANCE PAYS AT: 100%               PATIENT RESPONSIBILITY AND/OR CO-PAY: NA   SECONDARY INSURANCE COMPANY:  NONE       PRE CERTIFICATION REQUIRED: NO   INSURANCE THERAPY BENEFIT:  NO VISIT LIMIT   AQUATIC THERAPY COVERED: YES  MODALITIES COVERED:  YES    Visit # 4, 10 for progress note   Visits Allowed: No limit   Recertification Date: 68   Physician Follow-Up: About 4 weeks (approx. Week of )   Physician Orders: 2-3x/week for 6 weeks   History of Present Illness: Patient reports ankle sprain about 7 years ago. She received therapy at that time but was not successful. She saw ortho for consult and imaging revealed ligament tear. She continued therapy with moderate success. Patient underwent \"flat foot\" surgery in 2020. 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 Patient had nerve reconstruction/reinnervation in 2021. Her most recent surgery to reroute the nerve in 2022.  She reports coming off crutches about 1 week ago. She also was allowed to come out of walking boot. Patient reports putting on gym shoes about 1 week ago. SUBJECTIVE: Patient reporting pain level 7/10 and stating that she has been up on her foot more today. Has not taken anything for pain today. TREATMENT   Precautions: Fusion of lateral arch of foot   Pain: 7/10 L foot/ankle    X in shaded column indicates activity completed today   Modalities Parameters/  Location  Notes                     Manual Therapy Time/Technique  Notes   Manual soft tissue mobilization with Hawk  8 lateral and post 10 minutes. PROM. 4 way ankle/toe. Gentle ankle joint mobs 10           Exercise/Intervention   Notes   Long sitting ankle ROM 4 directions  15  x           Calf stretch with strap 3x 30  X    Seated:       Wooden Baps*: Fwd, Lat, Circles 15  x    Inv/Ev  10-15  x    Ankle pumps 10-15  X    Ankle circles 10-15 each   x           Standing:       Step Stretches*: Calf stretch, L DF stretch 3x30\"      Rhomberg on airex* EO/EC 2x20\"   No UE support   Modified Tandem on airex* EO/EC 2x20\"   Frequently reaching out ot bars for support    Heel/toe raises 10  x    Rocker board  15 taps  30 sec bal x    Heel toe rocking  10  x    Gait in // Bars*: Retro, Side Stepping 3 laps             Gait training in gym: 1 lap around gym   Cues provided for improved weight shifting to L and increased L heel strike. Pt walks with a slightly widened stance for stability. Specific Interventions Next Treatment: Ultrasound, massage, stretching/ROM, strengthening, gait training with shoes, balance    Activity/Treatment Tolerance:  [x]  Patient tolerated treatment well  []  Patient limited by fatigue  []  Patient limited by pain   []  Patient limited by medical complications  []  Other:     Assessment: Added standing exercises with patient tolerating well.  Also initiated hawk  to help reduce tightness in calf with knotty tissue noted throughout. Patient reporting feeling better at end of session. GOALS:  Patient Goal: To be normal    Short Term Goals: 3 weeks  -Patient will have greater than or equal to 8 degrees of ankle dorsiflexion in Left ankle/foot in order to improve functional use of foot.  -Patient will have greater than or equal to 5 seconds of unsupported standing on Left foot to improve stability.  -Patient will report no tenderness to palpation along Achilles tendon to allow for improved comfort wearing shoes. Long Term Goals: 6 weeks  -Patient will report less than or equal to 4/10 in foot while weight bearing in order to improve gait efficiency.  -Patient will report increased quality of life as evidenced by LEFS score greater than or equal to 50/80.  -Patient will safely negotiate stairs using reciprocal pattern and no UE support in order to increase community mobility.  -Patient will ambulate with greater than or equal to 150 feet with proper footwear and no signs of antalgia. Patient Education:   [x]  HEP/Education Completed: continue with HEP and importance of being consistent. [employer] Access Code:[email or web address]  []  No new Education completed  []  Reviewed Prior HEP      [x]  Patient verbalized and/or demonstrated understanding of education provided. []  Patient unable to verbalize and/or demonstrate understanding of education provided. Will continue education. []  Barriers to learning:     PLAN:  Treatment Recommendations: Strengthening, Range of Motion, Balance Training, Gait Training, Manual Therapy - Soft Tissue Mobilization, Pain Management, Home Exercise Program, Patient Education, Aquatics and Modalities     Plan to see patient 2-3 times per week for 6 weeks to address the treatment planned outlined above.   [x]  Continue with current plan of care  []  Modify plan of care as follows:    []  Hold pending physician visit  []  Discharge    Time In 1412   Time Out 7408   Timed Code Minutes: 36 min   Total Treatment Time: 36 min     Electronically Signed by: Chelsey Macias PTA

## 2022-08-18 ENCOUNTER — HOSPITAL ENCOUNTER (OUTPATIENT)
Dept: PHYSICAL THERAPY | Age: 52
Setting detail: THERAPIES SERIES
Discharge: HOME | End: 2022-08-18
Payer: COMMERCIAL

## 2022-08-18 PROCEDURE — 97140 MANUAL THERAPY 1/> REGIONS: CPT

## 2022-08-18 PROCEDURE — 97110 THERAPEUTIC EXERCISES: CPT

## 2022-08-18 NOTE — PROGRESS NOTES
[address] [facility]  PHYSICAL THERAPY  [x] DAILY NOTE (LAND) [] DAILY NOTE (AQUATIC ) [] PROGRESS NOTE [] DISCHARGE NOTE    [x] OUTPATIENT REHABILITATION CENTER [facility]   [] [facility]    [] [facility]   [] Altagracia Irby    Date: 2022  Patient Name:  Sarah Zuniga  : 1970  MRN: [id number]  CSN: [id number]    Referring Practitioner Rodgers, Beck Stapleton DPM   Diagnosis CHILLES TENDONITIS LEFT FOOT    Treatment Diagnosis Foot pain, ROM deficits, gait deviations, balance deficits   Date of Evaluation 22    Additional Pertinent History Reconstructive surgery on foot, reinnervation procedures x2      Functional Outcome Measure Used LEFS   Functional Outcome Score 30/80 (22)       Insurance: Primary: Payor: Tari Romero /  /  / ,   Secondary:    Authorization Information: OUTPATIENT BENEFITS:               DEDUCTIBLE: $MET                           OUT OF POCKET: $MET               INSURANCE PAYS AT: 100%               PATIENT RESPONSIBILITY AND/OR CO-PAY: NA   SECONDARY INSURANCE COMPANY:  NONE       PRE CERTIFICATION REQUIRED: NO   INSURANCE THERAPY BENEFIT:  NO VISIT LIMIT   AQUATIC THERAPY COVERED: YES  MODALITIES COVERED:  YES    Visit # 4, 4/10 for progress note   Visits Allowed: No limit   Recertification Date: 66   Physician Follow-Up: About 4 weeks (approx. Week of )   Physician Orders: 2-3x/week for 6 weeks   History of Present Illness: Patient reports ankle sprain about 7 years ago. She received therapy at that time but was not successful. She saw ortho for consult and imaging revealed ligament tear. She continued therapy with moderate success. Patient underwent \"flat foot\" surgery in 2020. 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 Patient had nerve reconstruction/reinnervation in 2021. Her most recent surgery to reroute the nerve in 2022.  She reports coming off Attempted to contact patient. Left message for patient to call back.  This is to go over INR crutches about 1 week ago. She also was allowed to come out of walking boot. Patient reports putting on gym shoes about 1 week ago. SUBJECTIVE: Patient reporting pain has been pretty intense today. Pt has difficulty pin-pointing pain d/t numbness, but mainly feels it in achilles region and lateral ankle        TREATMENT   Precautions: Fusion of lateral arch of foot   Pain: 7/10 L foot/ankle    X in shaded column indicates activity completed today   Modalities Parameters/  Location  Notes                     Manual Therapy Time/Technique  Notes   Manual soft tissue mobilization with Hawk  8 lateral and post 10 minutes. Contract relax for ankle df* 2 sets, 5x x    Distraction and posterior ankle mobs 5 min x    Exercise/Intervention   Notes   Long sitting:       Alphabet 1x  x    Calf stretch with strap 3x 30 sec  X    Inv/Ev  15*  x    Ankle pumps 15*  X    Ankle circles- CW/CCW 15* each   x    Seated wooden BAPS: forward, lat, circles 15*  x    Standing:       Step Stretches: Calf stretch, L DF stretch 3x30\"      Rhomberg on airex EO/EC 2x20\"   No UE support   Modified Tandem on airex EO/EC 2x20\"   Frequently reaching out ot bars for support    Heel/toe raises 20*  x    Rocker board  15 taps  30 sec bal     Heel toe rocking  10  x    Gait in // Bars: Retro, Side Stepping 3 laps             Gait training in gym: 1 lap around gym   Cues provided for improved weight shifting to L and increased L heel strike. Pt walks with a slightly widened stance for stability. Specific Interventions Next Treatment: Ultrasound, massage, stretching/ROM, strengthening, gait training with shoes, balance    Activity/Treatment Tolerance:  [x]  Patient tolerated treatment well  []  Patient limited by fatigue  []  Patient limited by pain   []  Patient limited by medical complications  []  Other:     Assessment: Session shorted by fire alarm evacuation. Added manual contract relax for dorsiflexion.  Pt able to tolerate rep progression with multiple exercises. Pt notes slightly less pain at end of session. GOALS:  Patient Goal: To be normal    Short Term Goals: 3 weeks  -Patient will have greater than or equal to 8 degrees of ankle dorsiflexion in Left ankle/foot in order to improve functional use of foot.  -Patient will have greater than or equal to 5 seconds of unsupported standing on Left foot to improve stability.  -Patient will report no tenderness to palpation along Achilles tendon to allow for improved comfort wearing shoes. Long Term Goals: 6 weeks  -Patient will report less than or equal to 4/10 in foot while weight bearing in order to improve gait efficiency.  -Patient will report increased quality of life as evidenced by LEFS score greater than or equal to 50/80.  -Patient will safely negotiate stairs using reciprocal pattern and no UE support in order to increase community mobility.  -Patient will ambulate with greater than or equal to 150 feet with proper footwear and no signs of antalgia. Patient Education:   [x]  HEP/Education Completed: contract relax with [employer]   [facility] Access Code:[email or web address]  []  No new Education completed  []  Reviewed Prior HEP      [x]  Patient verbalized and/or demonstrated understanding of education provided. []  Patient unable to verbalize and/or demonstrate understanding of education provided. Will continue education. []  Barriers to learning:     PLAN:  Treatment Recommendations: Strengthening, Range of Motion, Balance Training, Gait Training, Manual Therapy - Soft Tissue Mobilization, Pain Management, Home Exercise Program, Patient Education, Aquatics and Modalities     Plan to see patient 2-3 times per week for 6 weeks to address the treatment planned outlined above.   [x]  Continue with current plan of care  []  Modify plan of care as follows:    []  Hold pending physician visit  []  Discharge    Time In 1445   Time Out 1515   Timed Code Minutes: 30 min   Total Treatment Time: 30 min     Electronically Signed by: Perry Spears PT

## 2022-08-22 ENCOUNTER — HOSPITAL ENCOUNTER (OUTPATIENT)
Dept: PHYSICAL THERAPY | Age: 52
Setting detail: THERAPIES SERIES
Discharge: HOME | End: 2022-08-22
Payer: COMMERCIAL

## 2022-08-22 PROCEDURE — 97110 THERAPEUTIC EXERCISES: CPT

## 2022-08-22 PROCEDURE — 97140 MANUAL THERAPY 1/> REGIONS: CPT

## 2022-08-22 NOTE — PROGRESS NOTES
[address] [facility]  PHYSICAL THERAPY  [x] DAILY NOTE (LAND) [] DAILY NOTE (AQUATIC ) [] PROGRESS NOTE [] DISCHARGE NOTE    [x] OUTPATIENT REHABILITATION CENTER [facility]   [] [place]     [] [facility]   [] Altagracia Irby    Date: 2022  Patient Name:  Sarah Zuniga  : 1970  MRN: [id number]  CSN: [id number]    Referring Practitioner Rodgers, Beck Stapleton DPM   Diagnosis CHILLES TENDONITIS LEFT FOOT    Treatment Diagnosis Foot pain, ROM deficits, gait deviations, balance deficits   Date of Evaluation 22    Additional Pertinent History Reconstructive surgery on foot, reinnervation procedures x2      Functional Outcome Measure Used LEFS   Functional Outcome Score 30/80 (22)       Insurance: Primary: Payor: Tari Romero /  /  / ,   Secondary:    Authorization Information: OUTPATIENT BENEFITS:               DEDUCTIBLE: $MET                           OUT OF POCKET: $MET               INSURANCE PAYS AT: 100%               PATIENT RESPONSIBILITY AND/OR CO-PAY: NA   SECONDARY INSURANCE COMPANY:  NONE       PRE CERTIFICATION REQUIRED: NO   INSURANCE THERAPY BENEFIT:  NO VISIT LIMIT   AQUATIC THERAPY COVERED: YES  MODALITIES COVERED:  YES    Visit # 4, 4/10 for progress note   Visits Allowed: No limit   Recertification Date: 29   Physician Follow-Up: About 4 weeks (approx. Week of )   Physician Orders: 2-3x/week for 6 weeks   History of Present Illness: Patient reports ankle sprain about 7 years ago. She received therapy at that time but was not successful. She saw ortho for consult and imaging revealed ligament tear. She continued therapy with moderate success. Patient underwent \"flat foot\" surgery in 2020. 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 Patient had nerve reconstruction/reinnervation in 2021. Her most recent surgery to reroute the nerve in 2022.  She reports coming off crutches about 1 week ago. She also was allowed to come out of walking boot. Patient reports putting on gym shoes about 1 week ago. SUBJECTIVE: Subjective reports of pain in the right ankle this date, rates 5/10. Patient reports utilizing ice often, sometimes twice a day, to alleviate persistent swelling and inflammation. Minor instability reported, denies fall or major LOB. Prolonged ambulation or standing activity exacerbates painful symptoms. TREATMENT   Precautions: Fusion of lateral arch of foot   Pain: 7/10 L foot/ankle    X in shaded column indicates activity completed today   Modalities Parameters/  Location  Notes                     Manual Therapy Time/Technique  Notes   Manual soft tissue mobilization with Hawk  8 lateral and post 10 minutes. Contract relax for ankle df  2 sets, 5x X    STM * 10 minutes X    Distraction and posterior ankle mobs 5 min     Exercise/Intervention   Notes   Long sitting:       Alphabet 1x  X    Calf stretch with strap 3x 30 sec      Inv/Ev  15  X    Ankle pumps 15  X    Ankle circles- CW/CCW 15 each   X    Seated wooden BAPS: forward, lat, circles 15      Standing:       Step Stretches: Calf stretch, L DF stretch 3x30\"      Rhomberg on airex EO/EC 2x20\"  X No UE support   Modified Tandem on airex EO/EC 2x20\"  X Frequently reaching out ot bars for support    Heel/toe raises 20  X    Rocker board forward and lateral 15 taps  30 sec bal X    Heel toe rocking  10      Gait in // Bars: Retro, Side Stepping,  3 laps             Gait training in gym: 1 lap around gym   Cues provided for improved weight shifting to L and increased L heel strike. Pt walks with a slightly widened stance for stability.            Dynamic Gait        Tandem  2 laps  X In Hallway   High Knee marching 2 laps  X    Side Stepping 2 laps  X                    Specific Interventions Next Treatment: Ultrasound, massage, stretching/ROM, strengthening, gait training with shoes, balance    Activity/Treatment Tolerance:  [x]  Patient tolerated treatment well  []  Patient limited by fatigue  []  Patient limited by pain   []  Patient limited by medical complications  []  Other:     Assessment: Treatment interventions completed as recorded in chart above. Patient reported significant relief from pain after termination of treatment interventions, attributed to manual therapy. Patient tolerated treatment well. GOALS:  Patient Goal: To be normal    Short Term Goals: 3 weeks  -Patient will have greater than or equal to 8 degrees of ankle dorsiflexion in Left ankle/foot in order to improve functional use of foot.  -Patient will have greater than or equal to 5 seconds of unsupported standing on Left foot to improve stability.  -Patient will report no tenderness to palpation along Achilles tendon to allow for improved comfort wearing shoes. Long Term Goals: 6 weeks  -Patient will report less than or equal to 4/10 in foot while weight bearing in order to improve gait efficiency.  -Patient will report increased quality of life as evidenced by LEFS score greater than or equal to 50/80.  -Patient will safely negotiate stairs using reciprocal pattern and no UE support in order to increase community mobility.  -Patient will ambulate with greater than or equal to 150 feet with proper footwear and no signs of antalgia. Patient Education:   []  HEP/Education Completed: contract relax with [employer] Access Code:[email or web address]  []  No new Education completed  []  Reviewed Prior HEP      [x]  Patient verbalized and/or demonstrated understanding of education provided. []  Patient unable to verbalize and/or demonstrate understanding of education provided. Will continue education.   []  Barriers to learning:     PLAN:  Treatment Recommendations: Strengthening, Range of Motion, Balance Training, Gait Training, Manual Therapy - Soft Tissue Mobilization, Pain Management, Home Exercise Program, Patient Education, Aquatics and Modalities     Plan to see patient 2-3 times per week for 6 weeks to address the treatment planned outlined above.   [x]  Continue with current plan of care  []  Modify plan of care as follows:    []  Hold pending physician visit  []  Discharge    Time In 1402   Time Out 1447   Timed Code Minutes: 45 min   Total Treatment Time: 45 min     Electronically Signed by: Kelvin Yoon PTA

## 2022-08-25 ENCOUNTER — HOSPITAL ENCOUNTER (OUTPATIENT)
Dept: PHYSICAL THERAPY | Age: 52
Setting detail: THERAPIES SERIES
Discharge: HOME | End: 2022-08-25
Payer: COMMERCIAL

## 2022-08-25 PROCEDURE — 97110 THERAPEUTIC EXERCISES: CPT

## 2022-08-25 PROCEDURE — 97140 MANUAL THERAPY 1/> REGIONS: CPT

## 2022-08-25 NOTE — PROGRESS NOTES
[address] [facility]  PHYSICAL THERAPY  [x] DAILY NOTE (LAND) [] DAILY NOTE (AQUATIC ) [] PROGRESS NOTE [] DISCHARGE NOTE    [x] OUTPAT[facility] - [place]   [] [place] [address]    [] [facility]   [] Tesfaye Calderons    Date: 2022  Patient Name:  Consuelo Funez  : 1970  MRN: [id number]  CSN: [id number]    Referring Practitioner Rodgers, Nuzhat Merino DPM   Diagnosis CHILLES TENDONITIS LEFT FOOT    Treatment Diagnosis Foot pain, ROM deficits, gait deviations, balance deficits   Date of Evaluation 22    Additional Pertinent History Reconstructive surgery on foot, reinnervation procedures x2      Functional Outcome Measure Used LEFS   Functional Outcome Score 30/80 (22)       Insurance: Primary: Payor: Darien Baxter /  /  / ,   Secondary:    Authorization Information: OUTPATIENT BENEFITS:               DEDUCTIBLE: $MET                           OUT OF POCKET: $MET               INSURANCE PAYS AT: 100%               PATIENT RESPONSIBILITY AND/OR CO-PAY: NA   SECONDARY INSURANCE COMPANY:  NONE       PRE CERTIFICATION REQUIRED: NO   INSURANCE THERAPY BENEFIT:  NO VISIT LIMIT   AQUATIC THERAPY COVERED: YES  MODALITIES COVERED:  YES    Visit # 6, 6/10 for progress note   Visits Allowed: No limit   Recertification Date: 98   Physician Follow-Up: About 4 weeks (approx. Week of )   Physician Orders: 2-3x/week for 6 weeks   History of Present Illness: Patient reports ankle sprain about 7 years ago. She received therapy at that time but was not successful. She saw ortho for consult and imaging revealed ligament tear. She continued therapy with moderate success. Patient underwent \"flat foot\" surgery in 2020. 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 Patient had nerve reconstruction/reinnervation in 2021. Her most recent surgery to reroute the nerve in 2022.  She reports coming off crutches about 1 week ago. She also was allowed to come out of walking boot. Patient reports putting on gym shoes about 1 week ago. SUBJECTIVE: Patient reporting that she was on her feet more the other day and current pain 7/10 today. TREATMENT   Precautions: Fusion of lateral arch of foot   Pain: 7/10 L foot/ankle    X in shaded column indicates activity completed today   Modalities Parameters/  Location  Notes                     Manual Therapy Time/Technique  Notes   Manual soft tissue mobilization with Hawk  8 lateral and post 10 minutes. x    Contract relax for ankle df  2 sets, 5x     STM * 10 minutes     Distraction and posterior ankle mobs 5 min     Exercise/Intervention   Notes   Long sitting:       Alphabet 1x  X    Calf stretch with strap 3x 30 sec      Inv/Ev  15  X    Ankle pumps 15  X    Ankle circles- CW/CCW 15 each   X    Seated wooden BAPS: forward, lat, circles 15      Standing:       Stretches: Calf stretch, L soleus 3x30\"  x    Rhomberg on airex EO/EC 2x20\"  X No UE support   Modified Tandem on airex EO 2x20\"  X Frequently reaching out ot bars for support    Heel/toe raises 20  X    Rocker board forward and lateral 15 taps  30 sec bal X    4 inch step up forward/lateral  10 L   x    Heel tap forward/lateral off edge of // bars  10  x    Total gym squats                  Heel raises   x    Gait training in gym: 1 lap around gym   Cues provided for improved weight shifting to L and increased L heel strike. Pt walks with a slightly widened stance for stability.            Dynamic Gait        Tandem  2 laps  X In Hallway   High Knee marching 2 laps  X    Side Stepping 2 laps  X                    Specific Interventions Next Treatment: Ultrasound, massage, stretching/ROM, strengthening, gait training with shoes, balance    Activity/Treatment Tolerance:  [x]  Patient tolerated treatment well  []  Patient limited by fatigue  []  Patient limited by pain   []  Patient limited by medical complications  []  Other:     Assessment: Continued with manual work with patient reporting that ankle felt better following. Did progress with step ups and total gym to work on strength with patient having no complains. Patient reporting ankle pain 6/10 at end of session. GOALS:  Patient Goal: To be normal    Short Term Goals: 3 weeks  -Patient will have greater than or equal to 8 degrees of ankle dorsiflexion in Left ankle/foot in order to improve functional use of foot.  -Patient will have greater than or equal to 5 seconds of unsupported standing on Left foot to improve stability.  -Patient will report no tenderness to palpation along Achilles tendon to allow for improved comfort wearing shoes. Long Term Goals: 6 weeks  -Patient will report less than or equal to 4/10 in foot while weight bearing in order to improve gait efficiency.  -Patient will report increased quality of life as evidenced by LEFS score greater than or equal to 50/80.  -Patient will safely negotiate stairs using reciprocal pattern and no UE support in order to increase community mobility.  -Patient will ambulate with greater than or equal to 150 feet with proper footwear and no signs of antalgia. Patient Education:   [x]  HEP/Education Completed: monitor response to strengthening. [employer] Access Code:[email or web address]  []  No new Education completed  []  Reviewed Prior HEP      [x]  Patient verbalized and/or demonstrated understanding of education provided. []  Patient unable to verbalize and/or demonstrate understanding of education provided. Will continue education. []  Barriers to learning:     PLAN:  Treatment Recommendations: Strengthening, Range of Motion, Balance Training, Gait Training, Manual Therapy - Soft Tissue Mobilization, Pain Management, Home Exercise Program, Patient Education, Aquatics and Modalities     Plan to see patient 2-3 times per week for 6 weeks to address the treatment planned outlined above.   [x] Continue with current plan of care  []  Modify plan of care as follows:    []  Hold pending physician visit  []  Discharge    Time In 1538   Time Out 1618   Timed Code Minutes: 40 min   Total Treatment Time: 40 min     Electronically Signed by: Froy Peralta PTA

## 2022-08-29 ENCOUNTER — HOSPITAL ENCOUNTER (OUTPATIENT)
Dept: PHYSICAL THERAPY | Age: 52
Setting detail: THERAPIES SERIES
Discharge: HOME | End: 2022-08-29
Payer: COMMERCIAL

## 2022-08-29 PROCEDURE — 97110 THERAPEUTIC EXERCISES: CPT

## 2022-08-29 PROCEDURE — 97140 MANUAL THERAPY 1/> REGIONS: CPT

## 2022-08-29 NOTE — PROGRESS NOTES
[address] [facility]  PHYSICAL THERAPY  [x] DAILY NOTE (LAND) [] DAILY NOTE (AQUATIC ) [] PROGRESS NOTE [] DISCHARGE NOTE    [x] OUTPATIENT REHABILITATION CENTER [facility]   [] [place] [address]    [] [facility]   [] [facility]    Date: 2022  Patient Name:  Mary Corado  : 1970  MRN: [id number]  CSN: [id number]    Referring Practitioner Rodgers, Roxana Meek DPM   Diagnosis CHILLES TENDONITIS LEFT FOOT    Treatment Diagnosis Foot pain, ROM deficits, gait deviations, balance deficits   Date of Evaluation 22    Additional Pertinent History Reconstructive surgery on foot, reinnervation procedures x2      Functional Outcome Measure Used LEFS   Functional Outcome Score 30/80 (22)       Insurance: Primary: Payor: Mahnaz Watson 150 /  /  / ,   Secondary:    Authorization Information: OUTPATIENT BENEFITS:               DEDUCTIBLE: $MET                           OUT OF POCKET: $MET               INSURANCE PAYS AT: 100%               PATIENT RESPONSIBILITY AND/OR CO-PAY: NA   SECONDARY INSURANCE COMPANY:  NONE       PRE CERTIFICATION REQUIRED: NO   INSURANCE THERAPY BENEFIT:  NO VISIT LIMIT   AQUATIC THERAPY COVERED: YES  MODALITIES COVERED:  YES    Visit # 7, 7/10 for progress note   Visits Allowed: No limit   Recertification Date: 72   Physician Follow-Up: About 4 weeks (approx. Week of )   Physician Orders: 2-3x/week for 6 weeks   History of Present Illness: Patient reports ankle sprain about 7 years ago. She received therapy at that time but was not successful. She saw ortho for consult and imaging revealed ligament tear. She continued therapy with moderate success. Patient underwent \"flat foot\" surgery in 2020. 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 Patient had nerve reconstruction/reinnervation in 2021. Her most recent surgery to reroute the nerve in 2022.  She reports coming off crutches about 1 week ago. She also was allowed to come out of walking boot. Patient reports putting on gym shoes about 1 week ago. SUBJECTIVE: Pt states feeling good after previous session. Notes manual therapy seems to be helping \"loosen up\" her ankle. TREATMENT   Precautions: Fusion of lateral arch of foot   Pain: 6/10 L foot/ankle    X in shaded column indicates activity completed today   Modalities Parameters/  Location  Notes                     Manual Therapy Time/Technique  Notes   Manual soft tissue mobilization with Hawk  8 lateral and post 10 minutes. x    Contract relax for ankle df  2 sets, 5x     STM * 10 minutes     Distraction and posterior ankle mobs 5 min     Exercise/Intervention   Notes   Long sitting:       Alphabet 1x  X    Calf stretch with strap 3x 30 sec      Inv/Ev  15  X    Ankle pumps 20*  X    Ankle circles- CW/CCW 20*each   X    Seated wooden BAPS: forward, lat, circles 15      Standing:       Stretches: Calf stretch, L soleus 3x30\"  x    Rhomberg on airex EO/EC 2x20\"   No UE support   Modified Tandem on airex EO 2x20\"   Frequently reaching out ot bars for support    Heel/toe raises 20      Rocker board forward and lateral 15 taps  30 sec bal     6* inch step up forward/lateral  10 L   x    Heel tap forward/lateral off edge of // bars  10      Total gym squats                  Heel raises       Gait training in gym: 1 lap around gym   Cues provided for improved weight shifting to L and increased L heel strike. Pt walks with a slightly widened stance for stability.            Dynamic Gait        Tandem  2 laps  X In Hallway   High Knee marching 2 laps  X    Side Stepping 2 laps  X    HS curl* 2 laps  x             Specific Interventions Next Treatment: Ultrasound, massage, stretching/ROM, strengthening, gait training with shoes, balance    Activity/Treatment Tolerance:  [x]  Patient tolerated treatment well  []  Patient limited by fatigue  []  Patient limited by pain   []  Patient limited by medical complications  []  Other:     Assessment: Continued with manual work with pt noting relief of swelling and stiffness. Progressed therex with increased reps. Pt completed exercises with slow pace. Will continue to progress as tolerated by pt per POC. GOALS:  Patient Goal: To be normal    Short Term Goals: 3 weeks  -Patient will have greater than or equal to 8 degrees of ankle dorsiflexion in Left ankle/foot in order to improve functional use of foot.  -Patient will have greater than or equal to 5 seconds of unsupported standing on Left foot to improve stability.  -Patient will report no tenderness to palpation along Achilles tendon to allow for improved comfort wearing shoes. Long Term Goals: 6 weeks  -Patient will report less than or equal to 4/10 in foot while weight bearing in order to improve gait efficiency.  -Patient will report increased quality of life as evidenced by LEFS score greater than or equal to 50/80.  -Patient will safely negotiate stairs using reciprocal pattern and no UE support in order to increase community mobility.  -Patient will ambulate with greater than or equal to 150 feet with proper footwear and no signs of antalgia. Patient Education:   [x]  HEP/Education Completed: monitor response to strengthening. [employer] Access Code:[email or web address]  []  No new Education completed  []  Reviewed Prior HEP      [x]  Patient verbalized and/or demonstrated understanding of education provided. []  Patient unable to verbalize and/or demonstrate understanding of education provided. Will continue education.   []  Barriers to learning:     PLAN:  Treatment Recommendations: Strengthening, Range of Motion, Balance Training, Gait Training, Manual Therapy - Soft Tissue Mobilization, Pain Management, Home Exercise Program, Patient Education, Aquatics and Modalities     Plan to see patient 2-3 times per week for 6 weeks to address the treatment planned outlined above.  [x]  Continue with current plan of care  []  Modify plan of care as follows:    []  Hold pending physician visit  []  Discharge    Time In 1320   Time Out 1400   Timed Code Minutes: 40   Total Treatment Time: 40     Electronically Signed by: Jacquie Lugo PTA

## 2022-10-21 ENCOUNTER — HOSPITAL ENCOUNTER (EMERGENCY)
Age: 52
Discharge: HOME | End: 2022-10-21
Payer: COMMERCIAL

## 2022-10-21 ENCOUNTER — APPOINTMENT (OUTPATIENT)
Dept: GENERAL RADIOLOGY | Age: 52
End: 2022-10-21
Payer: COMMERCIAL

## 2022-10-21 VITALS
TEMPERATURE: 97.7 F | OXYGEN SATURATION: 97 % | DIASTOLIC BLOOD PRESSURE: 62 MMHG | RESPIRATION RATE: 16 BRPM | HEART RATE: 84 BPM | SYSTOLIC BLOOD PRESSURE: 102 MMHG

## 2022-10-21 DIAGNOSIS — J40 BRONCHITIS: Primary | ICD-10-CM

## 2022-10-21 LAB — SARS-COV-2, NAA: NOT  DETECTED

## 2022-10-21 PROCEDURE — 87635 SARS-COV-2 COVID-19 AMP PRB: CPT

## 2022-10-21 PROCEDURE — 99213 OFFICE O/P EST LOW 20 MIN: CPT | Performed by: EMERGENCY MEDICINE

## 2022-10-21 PROCEDURE — 99213 OFFICE O/P EST LOW 20 MIN: CPT

## 2022-10-21 PROCEDURE — 71045 X-RAY EXAM CHEST 1 VIEW: CPT

## 2022-10-21 RX ORDER — DEXTROMETHORPHAN HYDROBROMIDE AND PROMETHAZINE HYDROCHLORIDE 15; 6.25 MG/5ML; MG/5ML
5 SYRUP ORAL 4 TIMES DAILY PRN
Qty: 180 ML | Refills: 0 | Status: SHIPPED | OUTPATIENT
Start: 2022-10-21 | End: 2022-10-28

## 2022-10-21 RX ORDER — PREDNISONE 20 MG/1
40 TABLET ORAL DAILY
Qty: 10 TABLET | Refills: 0 | Status: SHIPPED | OUTPATIENT
Start: 2022-10-21 | End: 2022-10-21 | Stop reason: CLARIF

## 2022-10-21 RX ORDER — PREDNISONE 20 MG/1
40 TABLET ORAL DAILY
Qty: 10 TABLET | Refills: 0 | Status: SHIPPED | OUTPATIENT
Start: 2022-10-21 | End: 2022-10-26

## 2022-10-21 RX ORDER — DEXTROMETHORPHAN HYDROBROMIDE AND PROMETHAZINE HYDROCHLORIDE 15; 6.25 MG/5ML; MG/5ML
5 SYRUP ORAL 4 TIMES DAILY PRN
Qty: 180 ML | Refills: 0 | Status: SHIPPED | OUTPATIENT
Start: 2022-10-21 | End: 2022-10-21 | Stop reason: CLARIF

## 2022-10-21 ASSESSMENT — ENCOUNTER SYMPTOMS
COUGH: 1
DIARRHEA: 0
NAUSEA: 0
SHORTNESS OF BREATH: 0
ABDOMINAL PAIN: 0
CHEST TIGHTNESS: 1
VOMITING: 0
SORE THROAT: 0

## 2022-10-21 ASSESSMENT — PAIN SCALES - GENERAL: PAINLEVEL_OUTOF10: 4

## 2022-10-21 ASSESSMENT — PAIN - FUNCTIONAL ASSESSMENT: PAIN_FUNCTIONAL_ASSESSMENT: 0-10

## 2022-10-21 NOTE — ED TRIAGE NOTES
Has had a cold for the last 6 days, now has productive cough, chest hurts when coughing, had pneumonia back in february

## 2022-10-21 NOTE — ED PROVIDER NOTES
[facility]       CHIEF COMPLAINT       Chief Complaint   Patient presents with    Cough       Nurses Notes reviewed and I agree except as noted in the HPI. HISTORY OF PRESENT ILLNESS   Fabiola Mock is a 50 y.o. female who presents for complaints of cough and chest congestion that has been present for approximately 5 to 6 days. She states her grandbaby has been sick with an upper respiratory infection. She is around her grandbaby often but no other sick contacts. Patient states she feels like she did when she had pneumonia in January of this year. She was also positive for COVID-19 at that time. She states symptoms are not near as severe. No fevers. She does have green sputum. No short of breath but does not feel that she can take a deep breath. Patient is coughing frequently. She is using over-the-counter DayQuil and NyQuil with minimal improvement. No sinus pain or pressure, no sore throat. No abdominal discomfort, nausea, vomiting or diarrhea. HPI    REVIEW OF SYSTEMS     Review of Systems   Constitutional:  Positive for fatigue. Negative for activity change, chills and fever. HENT:  Negative for congestion and sore throat. Respiratory:  Positive for cough and chest tightness. Negative for shortness of breath. Cardiovascular:  Negative for chest pain. Gastrointestinal:  Negative for abdominal pain, diarrhea, nausea and vomiting. Neurological:  Negative for dizziness and headaches. PAST MEDICAL HISTORY         Diagnosis Date    COVID-19 2022    History of blood transfusion 2018    anemic due to heavy menses    Prolonged emergence from general anesthesia        SURGICALHISTORY     Patient  has a past surgical history that includes Anterior cruciate ligament repair (Left, ); Foot surgery (Left, 2020); Nerve graft (Left, 2021); other surgical history (2019);  section ();  Tonsillectomy; skin biopsy; and Achilles tendon surgery (Left, 4/14/2022). CURRENT MEDICATIONS       Discharge Medication List as of 10/21/2022  1:34 PM        CONTINUE these medications which have NOT CHANGED    Details   Pregabalin (LYRICA PO) Take by mouthHistorical Med             ALLERGIES     Patient is is allergic to oxycodone and shellfish allergy. Patients   Immunization History   Administered Date(s) Administered    Influenza Virus Vaccine 10/06/2011, 10/06/2011, 01/11/2013, 01/11/2013    Tdap (Boostrix, Adacel) 09/20/2007       FAMILY HISTORY     Patient's family history includes Diabetes in her mother. SOCIAL HISTORY     Patient  reports that she has never smoked. She has never used smokeless tobacco. She reports that she does not drink alcohol and does not use drugs. PHYSICAL EXAM     ED TRIAGE VITALS  BP: 102/62, Temp: 97.7 °F (36.5 °C), Heart Rate: 84, Resp: 16, SpO2: 97 %,Estimated body mass index is 35.46 kg/m² as calculated from the following:    Height as of 4/14/22: 5' 4\" (1.626 m). Weight as of 4/14/22: 206 lb 9.6 oz (93.7 kg). ,Patient's last menstrual period was 08/21/2022. Physical Exam  Constitutional:       General: She is not in acute distress. Appearance: She is normal weight. She is not ill-appearing. HENT:      Head: Normocephalic. Cardiovascular:      Rate and Rhythm: Normal rate. Pulses: Normal pulses. Heart sounds: Normal heart sounds. Pulmonary:      Effort: Pulmonary effort is normal.      Breath sounds: Normal breath sounds. Abdominal:      General: Abdomen is flat. Bowel sounds are normal. There is no distension. Palpations: Abdomen is soft. Tenderness: There is no abdominal tenderness. Skin:     General: Skin is warm and dry. Neurological:      General: No focal deficit present. Mental Status: She is alert.    Psychiatric:         Mood and Affect: Mood normal.       DIAGNOSTIC RESULTS     Labs:  Results for orders placed or performed during the hospital encounter of 10/21/22   COVID-19, Rapid   Result Value Ref Range    SARS-CoV-2, KRISSY NOT  DETECTED NOT DETECTED       IMAGING:    XR CHEST 1 VIEW   Final Result   No acute cardiopulmonary disease. **This report has been created using voice recognition software. It may contain minor errors which are inherent in voice recognition technology. **      Final report electronically signed by Dr. Gilbert Macias on 10/21/2022 1:36 PM            EKG:      URGENT CARE COURSE:     Vitals:    10/21/22 1238   BP: 102/62   Pulse: 84   Resp: 16   Temp: 97.7 °F (36.5 °C)   TempSrc: Infrared   SpO2: 97%       Medications - No data to display         PROCEDURES:  None    FINAL IMPRESSION      1. Bronchitis          DISPOSITION/ PLAN     Patient presents for his likely viral bronchitis. Patient's COVID testing is negative. Chest x-ray shows no acute cardiopulmonary process. 1 view chest was ordered as the facility currently only has portable x-ray machine capability. Patient will be discharged with prednisone, Promethazine DM for treatment of symptoms. Advised to drink plenty of fluids. Tylenol as needed. Return for new or worsening symptoms or if no improvement in 3 to 5 days. PATIENT REFERRED TO:  Chata Boyd, APRN - CNP  [address]  [facility] / [facility] [address]      DISCHARGE MEDICATIONS:  Discharge Medication List as of 10/21/2022  1:34 PM        START taking these medications    Details   predniSONE (DELTASONE) 20 MG tablet Take 2 tablets by mouth daily for 5 days, Disp-10 tablet, R-0Normal      promethazine-dextromethorphan (PROMETHAZINE-DM) 6.25-15 MG/5ML syrup Take 5 mLs by mouth 4 times daily as needed for Cough, Disp-180 mL, R-0Normal             Discharge Medication List as of 10/21/2022  1:34 PM        STOP taking these medications       rivaroxaban (XARELTO) 10 MG TABS tablet Comments:   Reason for Stopping:         amitriptyline (ELAVIL) 10 MG tablet Comments:   Reason for Stopping:         diclofenac sodium (VOLTAREN) 1 % GEL Comments:   Reason for Stopping:               Discharge Medication List as of 10/21/2022  1:34 PM          PHILL Crews - CNP    (Please note that portions of this note were completed with a voice recognition program. Efforts were made to edit the dictations but occasionally words are mis-transcribed.)           PHILL Crews - CNP  10/21/22 8512

## 2022-10-21 NOTE — DISCHARGE INSTRUCTIONS
Prednisone as directed    Drink plenty of water    Promethazine DM as directed as needed for cough    Return for new or worsening symptoms

## 2022-10-21 NOTE — Clinical Note
Brooklynn Franklin was seen and treated in our emergency department on 10/21/2022. She may return to work on 10/23/2022. If you have any questions or concerns, please don't hesitate to call.       Mikle Goodell, APRN - CNP

## 2022-10-28 ENCOUNTER — TELEPHONE (OUTPATIENT)
Dept: FAMILY MEDICINE CLINIC | Age: 52
End: 2022-10-28

## 2022-10-28 DIAGNOSIS — J40 BRONCHITIS: Primary | ICD-10-CM

## 2022-10-28 RX ORDER — AZITHROMYCIN 250 MG/1
250 TABLET, FILM COATED ORAL SEE ADMIN INSTRUCTIONS
Qty: 6 TABLET | Refills: 0 | Status: SHIPPED | OUTPATIENT
Start: 2022-10-28 | End: 2022-11-02

## 2022-10-28 NOTE — TELEPHONE ENCOUNTER
Noted.  Lucille Schultz sent to pharmacy.   Follow up in office next week if not improving. -WS    Orders Placed This Encounter   Medications    azithromycin (ZITHROMAX) 250 MG tablet     Sig: Take 1 tablet by mouth See Admin Instructions for 5 days 500mg on day 1 followed by 250mg on days 2 - 5     Dispense:  6 tablet     Refill:  0

## 2022-10-28 NOTE — TELEPHONE ENCOUNTER
C/O cough with phlegm and some change in breathing after coughing. Seen at [facility] on 10/21/22 and was diagnosed with bronchitis. Has been taking Promethazine-DM and Prednisone as prescribed but feels like she needs an antibiotic since she is not feeling much better. Cough is worse in the evening. No fever. Has a history of pneumonia, recent CXR normal. Uses [employer]. Please advise.

## 2023-01-03 ENCOUNTER — OFFICE VISIT (OUTPATIENT)
Dept: FAMILY MEDICINE CLINIC | Age: 53
End: 2023-01-03
Payer: COMMERCIAL

## 2023-01-03 VITALS
BODY MASS INDEX: 34.47 KG/M2 | SYSTOLIC BLOOD PRESSURE: 120 MMHG | RESPIRATION RATE: 16 BRPM | HEART RATE: 84 BPM | TEMPERATURE: 98.6 F | WEIGHT: 200.8 LBS | DIASTOLIC BLOOD PRESSURE: 80 MMHG

## 2023-01-03 DIAGNOSIS — F41.9 ANXIETY: Primary | ICD-10-CM

## 2023-01-03 DIAGNOSIS — F32.A DEPRESSION, UNSPECIFIED DEPRESSION TYPE: ICD-10-CM

## 2023-01-03 DIAGNOSIS — G57.92 NEUROPATHY OF LEFT ANKLE: ICD-10-CM

## 2023-01-03 PROCEDURE — 99214 OFFICE O/P EST MOD 30 MIN: CPT | Performed by: NURSE PRACTITIONER

## 2023-01-03 RX ORDER — DULOXETIN HYDROCHLORIDE 30 MG/1
30 CAPSULE, DELAYED RELEASE ORAL DAILY
Qty: 90 CAPSULE | Refills: 1 | Status: SHIPPED | OUTPATIENT
Start: 2023-01-03

## 2023-01-03 RX ORDER — IBUPROFEN 800 MG/1
TABLET ORAL
COMMUNITY
Start: 2022-12-13

## 2023-01-03 RX ORDER — HYDROXYZINE HYDROCHLORIDE 25 MG/1
25 TABLET, FILM COATED ORAL EVERY 8 HOURS PRN
Qty: 30 TABLET | Refills: 0 | Status: SHIPPED | OUTPATIENT
Start: 2023-01-03 | End: 2023-01-13

## 2023-01-03 RX ORDER — ACETAMINOPHEN AND CODEINE PHOSPHATE 300; 30 MG/1; MG/1
TABLET ORAL
COMMUNITY
Start: 2022-12-13

## 2023-01-03 ASSESSMENT — PATIENT HEALTH QUESTIONNAIRE - PHQ9
1. LITTLE INTEREST OR PLEASURE IN DOING THINGS: 3
3. TROUBLE FALLING OR STAYING ASLEEP: 3
2. FEELING DOWN, DEPRESSED OR HOPELESS: 3
5. POOR APPETITE OR OVEREATING: 2
SUM OF ALL RESPONSES TO PHQ QUESTIONS 1-9: 26
SUM OF ALL RESPONSES TO PHQ QUESTIONS 1-9: 23
SUM OF ALL RESPONSES TO PHQ9 QUESTIONS 1 & 2: 6
6. FEELING BAD ABOUT YOURSELF - OR THAT YOU ARE A FAILURE OR HAVE LET YOURSELF OR YOUR FAMILY DOWN: 3
4. FEELING TIRED OR HAVING LITTLE ENERGY: 3
SUM OF ALL RESPONSES TO PHQ QUESTIONS 1-9: 26
9. THOUGHTS THAT YOU WOULD BE BETTER OFF DEAD, OR OF HURTING YOURSELF: 3
8. MOVING OR SPEAKING SO SLOWLY THAT OTHER PEOPLE COULD HAVE NOTICED. OR THE OPPOSITE, BEING SO FIGETY OR RESTLESS THAT YOU HAVE BEEN MOVING AROUND A LOT MORE THAN USUAL: 3
7. TROUBLE CONCENTRATING ON THINGS, SUCH AS READING THE NEWSPAPER OR WATCHING TELEVISION: 3
10. IF YOU CHECKED OFF ANY PROBLEMS, HOW DIFFICULT HAVE THESE PROBLEMS MADE IT FOR YOU TO DO YOUR WORK, TAKE CARE OF THINGS AT HOME, OR GET ALONG WITH OTHER PEOPLE: 3
SUM OF ALL RESPONSES TO PHQ QUESTIONS 1-9: 26

## 2023-01-03 ASSESSMENT — COLUMBIA-SUICIDE SEVERITY RATING SCALE - C-SSRS
6. HAVE YOU EVER DONE ANYTHING, STARTED TO DO ANYTHING, OR PREPARED TO DO ANYTHING TO END YOUR LIFE?: NO
1. WITHIN THE PAST MONTH, HAVE YOU WISHED YOU WERE DEAD OR WISHED YOU COULD GO TO SLEEP AND NOT WAKE UP?: YES
2. HAVE YOU ACTUALLY HAD ANY THOUGHTS OF KILLING YOURSELF?: NO

## 2023-01-03 NOTE — PROGRESS NOTES
[address] [facility] [address]  Dept: [phone]  Dept Fax: [phone]: [phone]     1/3/2023     Jd Hansen (:  1970) is a 50 y.o. female, here for evaluation of the following medical concerns:    Chief Complaint   Patient presents with    Other     Patient wants to discuss mental health and applying for disability. HPI    Pt presents to the office today for some ongoing issues with ankle pain and also depression/anxiety. She has been dealing with a lot of pain from her ankle for many years. She has had multiple surgeries and she still has pain, the surgery most recently just made the pain change locations. She does have a follow up with another podiatrist and is hopeful that this will help. With all the pain, she is having trouble with focus, and her anxiety has increased. Pt also reports that she is Going through a  divorce. She knows that she needs to be strong for her granddaughter. She denies any suicidal plan, but has thought it would be easier if she was not here. Sleep-pain and trouble sleeping. Interest- less  Guilt- increased  Energy-less  Concentration- less  Appetite- OK  Psychomotor Retardation- NO  Suicidal/ Homicidal Ideations- NO  Anxiety-increased. Review of Systems   Constitutional:  Positive for fatigue. Negative for chills and fever. HENT:  Negative for congestion, facial swelling, sinus pain, sore throat and trouble swallowing. Respiratory:  Negative for cough, shortness of breath and wheezing. Cardiovascular:  Negative for chest pain and palpitations. Gastrointestinal:  Negative for abdominal pain, diarrhea, nausea and vomiting. Musculoskeletal:  Positive for arthralgias and gait problem. Negative for back pain and neck pain. Skin:  Negative for color change and rash. Neurological:  Negative for dizziness, weakness and headaches. Psychiatric/Behavioral:  Positive for decreased concentration, dysphoric mood and sleep disturbance. Negative for agitation, self-injury and suicidal ideas. The patient is nervous/anxious. Prior to Visit Medications    Medication Sig Taking? Authorizing Provider   acetaminophen-codeine (TYLENOL #3) 300-30 MG per tablet TAKE 1 TABLET BY MOUTH EVERY 4 TO 6 HOURS AS NEEDED FOR PAIN TAKE WITH FOOD MUST LAST 4 DAYS Yes Historical Provider, MD   ibuprofen (ADVIL;MOTRIN) 800 MG tablet TAKE 1 TABLET BY MOUTH EVERY 8 HOURS WITH FOOD Yes Historical Provider, MD   DULoxetine (CYMBALTA) 30 MG extended release capsule Take 1 capsule by mouth daily Yes PHILL Galvez CNP   hydrOXYzine HCl (ATARAX) 25 MG tablet Take 1 tablet by mouth every 8 hours as needed for Itching Yes PHILL Galvez CNP   Pregabalin (LYRICA PO) Take by mouth  Patient not taking: Reported on 1/3/2023  Historical Provider, MD        Social History     Tobacco Use    Smoking status: Never    Smokeless tobacco: Never   Substance Use Topics    Alcohol use: Never        Vitals:    01/03/23 1339   BP: 120/80   Site: Right Upper Arm   Pulse: 84   Resp: 16   Temp: 98.6 °F (37 °C)   TempSrc: Oral   Weight: 200 lb 12.8 oz (91.1 kg)     Estimated body mass index is 34.47 kg/m² as calculated from the following:    Height as of 4/14/22: 5' 4\" (1.626 m). Weight as of this encounter: 200 lb 12.8 oz (91.1 kg). Physical Exam  Vitals reviewed. Constitutional:       General: She is not in acute distress. Appearance: She is well-developed. HENT:      Head: Normocephalic and atraumatic. Eyes:      General:         Right eye: No discharge. Left eye: No discharge. Conjunctiva/sclera: Conjunctivae normal.   Pulmonary:      Effort: Pulmonary effort is normal. No respiratory distress. Skin:     General: Skin is warm and dry. Neurological:      General: No focal deficit present.       Mental Status: She is alert and oriented to person, place, and time. Coordination: Coordination normal.   Psychiatric:         Attention and Perception: Attention normal.         Mood and Affect: Mood is anxious. Affect is tearful. Speech: Speech normal.         Behavior: Behavior normal.         Thought Content: Thought content normal. Thought content does not include homicidal or suicidal plan. Judgment: Judgment normal.       ASSESSMENT/PLAN:  1. Anxiety  - DULoxetine (CYMBALTA) 30 MG extended release capsule; Take 1 capsule by mouth daily  Dispense: 90 capsule; Refill: 1  - hydrOXYzine HCl (ATARAX) 25 MG tablet; Take 1 tablet by mouth every 8 hours as needed for Itching  Dispense: 30 tablet; Refill: 0    2. Depression, unspecified depression type  - DULoxetine (CYMBALTA) 30 MG extended release capsule; Take 1 capsule by mouth daily  Dispense: 90 capsule; Refill: 1    3. Neuropathy of left ankle    - Long discussion with patient about symptoms and pt agreeable to medications. Pt given information about [facility] for therapy session. She will call for appt  - Will start pt on Cymbalta to help with ankle pain as well. - OK to use Atarax PRN anxiety and sleep. - Follow up with Podiatry next week as planned. We did discuss possible referral to [facility] or [facility] for further evaluation as well. Pt will let us know. - Call office with any questions or concerns, or if symptoms are getting worse or changing      Return in about 4 weeks (around 1/31/2023), or if symptoms worsen or fail to improve, for Routine follow up, Medication check. Patient given educational materials - see patient instructions. Discussed use, benefit, and side effects of prescribed medications. All patient questions answered. Pt voiced understanding. Reviewed health maintenance. An electronic signature was used to authenticate this note.     --Page Page, APRN - CNP on 1/4/2023 at 7:51 AM

## 2023-01-04 ASSESSMENT — ENCOUNTER SYMPTOMS
COLOR CHANGE: 0
FACIAL SWELLING: 0
SHORTNESS OF BREATH: 0
COUGH: 0
DIARRHEA: 0
NAUSEA: 0
WHEEZING: 0
SINUS PAIN: 0
ABDOMINAL PAIN: 0
BACK PAIN: 0
TROUBLE SWALLOWING: 0
SORE THROAT: 0
VOMITING: 0

## 2023-02-23 ENCOUNTER — HOSPITAL ENCOUNTER (EMERGENCY)
Age: 53
Discharge: HOME | End: 2023-02-23
Payer: COMMERCIAL

## 2023-02-23 VITALS
HEART RATE: 93 BPM | DIASTOLIC BLOOD PRESSURE: 76 MMHG | SYSTOLIC BLOOD PRESSURE: 120 MMHG | TEMPERATURE: 98.4 F | BODY MASS INDEX: 34.15 KG/M2 | HEIGHT: 64 IN | OXYGEN SATURATION: 98 % | RESPIRATION RATE: 14 BRPM | WEIGHT: 200 LBS

## 2023-02-23 DIAGNOSIS — U07.1 COVID-19: Primary | ICD-10-CM

## 2023-02-23 LAB
FLUAV AG SPEC QL: NEGATIVE
FLUBV AG SPEC QL: NEGATIVE
S PYO AG THROAT QL: NEGATIVE
SARS-COV-2 RDRP RESP QL NAA+PROBE: DETECTED

## 2023-02-23 PROCEDURE — 99213 OFFICE O/P EST LOW 20 MIN: CPT

## 2023-02-23 PROCEDURE — 87651 STREP A DNA AMP PROBE: CPT

## 2023-02-23 PROCEDURE — 87804 INFLUENZA ASSAY W/OPTIC: CPT

## 2023-02-23 PROCEDURE — 87635 SARS-COV-2 COVID-19 AMP PRB: CPT

## 2023-02-23 PROCEDURE — 99213 OFFICE O/P EST LOW 20 MIN: CPT | Performed by: NURSE PRACTITIONER

## 2023-02-23 RX ORDER — DEXTROMETHORPHAN HYDROBROMIDE AND PROMETHAZINE HYDROCHLORIDE 15; 6.25 MG/5ML; MG/5ML
5 SYRUP ORAL 4 TIMES DAILY PRN
Qty: 200 ML | Refills: 0 | Status: SHIPPED | OUTPATIENT
Start: 2023-02-23 | End: 2023-02-23 | Stop reason: SDUPTHER

## 2023-02-23 RX ORDER — GUAIFENESIN 600 MG/1
1200 TABLET, EXTENDED RELEASE ORAL 2 TIMES DAILY
Qty: 40 TABLET | Refills: 0 | Status: SHIPPED | OUTPATIENT
Start: 2023-02-23 | End: 2023-03-05

## 2023-02-23 RX ORDER — BENZONATATE 200 MG/1
200 CAPSULE ORAL 3 TIMES DAILY PRN
Qty: 30 CAPSULE | Refills: 0 | Status: SHIPPED | OUTPATIENT
Start: 2023-02-23

## 2023-02-23 RX ORDER — GUAIFENESIN 600 MG/1
1200 TABLET, EXTENDED RELEASE ORAL 2 TIMES DAILY
Qty: 40 TABLET | Refills: 0 | Status: SHIPPED | OUTPATIENT
Start: 2023-02-23 | End: 2023-02-23 | Stop reason: SDUPTHER

## 2023-02-23 RX ORDER — DEXTROMETHORPHAN HYDROBROMIDE AND PROMETHAZINE HYDROCHLORIDE 15; 6.25 MG/5ML; MG/5ML
5 SYRUP ORAL 4 TIMES DAILY PRN
Qty: 200 ML | Refills: 0 | Status: SHIPPED | OUTPATIENT
Start: 2023-02-23

## 2023-02-23 RX ORDER — BENZONATATE 200 MG/1
200 CAPSULE ORAL 3 TIMES DAILY PRN
Qty: 30 CAPSULE | Refills: 0 | Status: SHIPPED | OUTPATIENT
Start: 2023-02-23 | End: 2023-02-23 | Stop reason: SDUPTHER

## 2023-02-23 ASSESSMENT — ENCOUNTER SYMPTOMS
DIARRHEA: 0
NAUSEA: 0
COUGH: 1
SHORTNESS OF BREATH: 0
WHEEZING: 0
ABDOMINAL PAIN: 0
VOMITING: 0
EYE PAIN: 0
BLOOD IN STOOL: 0
RHINORRHEA: 1
CONSTIPATION: 0
SORE THROAT: 1

## 2023-02-23 ASSESSMENT — PAIN - FUNCTIONAL ASSESSMENT: PAIN_FUNCTIONAL_ASSESSMENT: NONE - DENIES PAIN

## 2023-02-23 NOTE — ED PROVIDER NOTES
[address]       Chief Complaint   Patient presents with    Pharyngitis    Nasal Congestion        Nurses Notes reviewed and I agree except as noted in the HPI. HISTORY OF PRESENT ILLNESS   Michael Verde is a 50 y.o. female who presents to urgent care with complaint of generalized body aches, chills, headache, nasal congestion, runny nose, cough sore throat and ear fullness that started 2 days ago. Patient states she has been taking over-the-counter Excedrin, DayQuil, elderberry syrup, honey and even tried whiskey to try to help with her symptoms. Patient denies other symptoms including chest pain, shortness of breath, nausea, vomiting, diarrhea or known fever    REVIEW OF SYSTEMS     Review of Systems   Constitutional:  Positive for chills. Negative for appetite change, fatigue, fever and unexpected weight change. HENT:  Positive for congestion, ear pain, rhinorrhea and sore throat. Eyes:  Negative for pain and visual disturbance. Respiratory:  Positive for cough. Negative for shortness of breath and wheezing. Cardiovascular:  Negative for chest pain, palpitations and leg swelling. Gastrointestinal:  Negative for abdominal pain, blood in stool, constipation, diarrhea, nausea and vomiting. Genitourinary:  Negative for dysuria, frequency and hematuria. Musculoskeletal:  Negative for arthralgias, joint swelling and neck stiffness. Skin:  Negative for rash. Neurological:  Negative for dizziness, syncope, weakness, light-headedness and headaches. Hematological:  Does not bruise/bleed easily. PAST MEDICAL HISTORY         Diagnosis Date    COVID-19 01/2022    History of blood transfusion 2018    anemic due to heavy menses    Prolonged emergence from general anesthesia        SURGICAL HISTORY     Patient  has a past surgical history that includes Anterior cruciate ligament repair (Left, 2006);  Foot surgery (Left, 01/2020); Nerve graft (Left, 2021); other surgical history (2019);  section (); Tonsillectomy; skin biopsy; and Achilles tendon surgery (Left, 2022). CURRENT MEDICATIONS       Discharge Medication List as of 2023  6:57 PM        CONTINUE these medications which have NOT CHANGED    Details   acetaminophen-codeine (TYLENOL #3) 300-30 MG per tablet TAKE 1 TABLET BY MOUTH EVERY 4 TO 6 HOURS AS NEEDED FOR PAIN TAKE WITH FOOD MUST LAST 4 DAYSHistorical Med      ibuprofen (ADVIL;MOTRIN) 800 MG tablet TAKE 1 TABLET BY MOUTH EVERY 8 HOURS WITH FOODHistorical Med      DULoxetine (CYMBALTA) 30 MG extended release capsule Take 1 capsule by mouth daily, Disp-90 capsule, R-1Normal      Pregabalin (LYRICA PO) Take by mouthHistorical Med             ALLERGIES     Patient is is allergic to oxycodone and shellfish allergy. FAMILY HISTORY     Patient'sfamily history includes Diabetes in her mother. SOCIAL HISTORY     Patient  reports that she has never smoked. She has never used smokeless tobacco. She reports that she does not drink alcohol and does not use drugs. PHYSICAL EXAM     ED TRIAGE VITALS  BP: 120/76, Temp: 98.4 °F (36.9 °C), Heart Rate: 93, Resp: 14, SpO2: 98 %  Physical Exam  Vitals and nursing note reviewed. Constitutional:       Appearance: She is well-developed. HENT:      Head: Normocephalic and atraumatic. Right Ear: A middle ear effusion is present. Left Ear: A middle ear effusion is present. Nose: Congestion present. Mouth/Throat:      Mouth: Mucous membranes are moist.      Pharynx: Posterior oropharyngeal erythema present. No oropharyngeal exudate. Tonsils: No tonsillar exudate. 1+ on the right. 1+ on the left. Eyes:      Conjunctiva/sclera: Conjunctivae normal.   Cardiovascular:      Rate and Rhythm: Normal rate and regular rhythm. Heart sounds: Normal heart sounds. No murmur heard. No gallop.    Pulmonary:      Effort: Pulmonary effort is normal. No respiratory distress. Breath sounds: Normal breath sounds. No stridor. No decreased breath sounds, wheezing, rhonchi or rales. Chest:      Chest wall: No tenderness. Musculoskeletal:         General: Normal range of motion. Cervical back: Normal range of motion and neck supple. Skin:     General: Skin is warm and dry. Neurological:      Mental Status: She is alert and oriented to person, place, and time. DIAGNOSTIC RESULTS   Labs:  Results for orders placed or performed during the hospital encounter of 02/23/23   COVID-19, Rapid   Result Value Ref Range    SARS-CoV-2, KRISSY DETECTED (AA) NOT DETECTED   Strep Screen Group A Throat   Result Value Ref Range    Rapid Strep A Screen NEGATIVE    Rapid influenza A/B antigens   Result Value Ref Range    Flu A Antigen Negative NEGATIVE    Flu B Antigen Negative NEGATIVE       IMAGING:  No orders to display     URGENT CARE COURSE:        MDM      Patient presents to urgent care with complaint of generalized body aches, chills, headache, nasal congestion, runny nose, cough sore throat and ear fullness that started 2 days ago. Differential diagnosis include not limited to  strep throat, covid 19, influenza or viral illness. Rapid strep and influenza test were negative. Patient does test positive for COVID-19. Patient be discharged home with symptomatic treatment. Patient to follow-up with primary care provider. Patient instructed to go to ER for worsening symptoms, inability to swallow, inability to keep liquids down, inability to urinate for greater than 8 hours or difficulty breathing. Follow-up with your primary care provider. Increase oral intake. Warm salt water gargles after meals and at bedtime to help with sore throat. May take tylenol or ibuprofen as needed for pain or fever.        Medications - No data to display  PROCEDURES:    Procedures    FINALIMPRESSION      1. COVID-19        DISPOSITION/PLAN   DISPOSITION Decision To Discharge 02/23/2023 06:57:41 PM    PATIENT REFERRED TO:  [address] [place]   [phone]    to establish care  DISCHARGE MEDICATIONS:  Discharge Medication List as of 2/23/2023  6:57 PM        START taking these medications    Details   loratadine-pseudoephedrine (CLARITIN-D 12HR) 5-120 MG per extended release tablet Take 1 tablet by mouth 2 times daily as needed (nasal congestion), Disp-30 tablet, R-0Normal      guaiFENesin (MUCINEX) 600 MG extended release tablet Take 2 tablets by mouth 2 times daily for 10 days, Disp-40 tablet, R-0Normal      benzonatate (TESSALON) 200 MG capsule Take 1 capsule by mouth 3 times daily as needed for Cough, Disp-30 capsule, R-0Normal      promethazine-dextromethorphan (PROMETHAZINE-DM) 6.25-15 MG/5ML syrup Take 5 mLs by mouth 4 times daily as needed for Cough This medication may make you sleepy. Do not take when operating a motor vehicle or heavy equipment. , Disp-200 mL, R-0Normal           Discharge Medication List as of 2/23/2023  6:57 PM          Florestine Appl, APRN - CNP        Florestine Appl, APRN - CNP  02/23/23 5388

## 2023-02-23 NOTE — Clinical Note
Nathanael Veras was seen and treated in our emergency department on 2/23/2023. She may return to work on 02/27/2023. If you have any questions or concerns, please don't hesitate to call.       Kat Zarate, APRN - CNP

## 2023-02-23 NOTE — Clinical Note
Melias Covarrubias was seen and treated in our emergency department on 2/23/2023. COVID19 virus is suspected. Per the CDC guidelines we recommend home isolation until the following conditions are all met:    1. At least five days have passed since symptoms first appeared and/or had a close exposure,   2. After home isolation for five days, wearing a mask around others for the next five days,  3. At least 24 have passed since last fever without the use of fever-reducing medications and  4. Symptoms (eg cough, shortness of breath) have improved    If you have any questions or concerns, please don't hesitate to call.     She may return to work/school on 02/27/2023        Sakina Pavon, APRN - CNP

## 2023-03-20 ENCOUNTER — HOSPITAL ENCOUNTER (EMERGENCY)
Age: 53
Discharge: HOME | End: 2023-03-20
Payer: COMMERCIAL

## 2023-03-20 VITALS
OXYGEN SATURATION: 97 % | SYSTOLIC BLOOD PRESSURE: 119 MMHG | HEIGHT: 64 IN | TEMPERATURE: 99 F | DIASTOLIC BLOOD PRESSURE: 64 MMHG | BODY MASS INDEX: 34.15 KG/M2 | RESPIRATION RATE: 16 BRPM | HEART RATE: 82 BPM | WEIGHT: 200 LBS

## 2023-03-20 DIAGNOSIS — S29.012A STRAIN OF THORACIC BACK REGION: Primary | ICD-10-CM

## 2023-03-20 PROCEDURE — 99213 OFFICE O/P EST LOW 20 MIN: CPT

## 2023-03-20 PROCEDURE — 99213 OFFICE O/P EST LOW 20 MIN: CPT | Performed by: EMERGENCY MEDICINE

## 2023-03-20 RX ORDER — CYCLOBENZAPRINE HCL 10 MG
10 TABLET ORAL 3 TIMES DAILY PRN
Qty: 21 TABLET | Refills: 0 | Status: SHIPPED | OUTPATIENT
Start: 2023-03-20 | End: 2023-03-30

## 2023-03-20 RX ORDER — NAPROXEN 500 MG/1
500 TABLET ORAL 2 TIMES DAILY WITH MEALS
Qty: 60 TABLET | Refills: 0 | Status: SHIPPED | OUTPATIENT
Start: 2023-03-20

## 2023-03-20 ASSESSMENT — PAIN - FUNCTIONAL ASSESSMENT: PAIN_FUNCTIONAL_ASSESSMENT: 0-10

## 2023-03-20 ASSESSMENT — ENCOUNTER SYMPTOMS
ABDOMINAL PAIN: 0
NAUSEA: 0
VOMITING: 0
BACK PAIN: 1

## 2023-03-20 ASSESSMENT — PAIN DESCRIPTION - ORIENTATION: ORIENTATION: LEFT

## 2023-03-20 ASSESSMENT — PAIN SCALES - GENERAL: PAINLEVEL_OUTOF10: 10

## 2023-03-20 ASSESSMENT — PAIN DESCRIPTION - LOCATION: LOCATION: BACK

## 2023-03-20 NOTE — ED PROVIDER NOTES
Disp-30 capsule, R-0Normal      loratadine-pseudoephedrine (CLARITIN-D 12HR) 5-120 MG per extended release tablet Take 1 tablet by mouth 2 times daily as needed (nasal congestion), Disp-30 tablet, R-0Normal      promethazine-dextromethorphan (PROMETHAZINE-DM) 6.25-15 MG/5ML syrup Take 5 mLs by mouth 4 times daily as needed for Cough This medication may make you sleepy. Do not take when operating a motor vehicle or heavy equipment. , Disp-200 mL, R-0Normal      acetaminophen-codeine (TYLENOL #3) 300-30 MG per tablet TAKE 1 TABLET BY MOUTH EVERY 4 TO 6 HOURS AS NEEDED FOR PAIN TAKE WITH FOOD MUST LAST 4 DAYSHistorical Med             ALLERGIES     Patient is is allergic to oxycodone and shellfish allergy. Patients   Immunization History   Administered Date(s) Administered    Influenza Virus Vaccine 10/06/2011, 10/06/2011, 01/11/2013, 01/11/2013    Tdap (Boostrix, Adacel) 09/20/2007       FAMILY HISTORY     Patient's family history includes Diabetes in her mother. SOCIAL HISTORY     Patient  reports that she has never smoked. She has never used smokeless tobacco. She reports that she does not drink alcohol and does not use drugs. PHYSICAL EXAM     ED TRIAGE VITALS  BP: 119/64, Temp: 99 °F (37.2 °C), Heart Rate: 82, Resp: 16, SpO2: 97 %,Estimated body mass index is 34.33 kg/m² as calculated from the following:    Height as of this encounter: 5' 4\" (1.626 m). Weight as of this encounter: 200 lb (90.7 kg). ,No LMP recorded. Patient is premenopausal.    Physical Exam  Constitutional:       General: She is not in acute distress. Appearance: She is normal weight. Pulmonary:      Effort: Pulmonary effort is normal.   Musculoskeletal:      Thoracic back: Spasms and tenderness present. Back:    Neurological:      Mental Status: She is alert. DIAGNOSTIC RESULTS     Labs:No results found for this visit on 03/20/23.     IMAGING:    No orders to display         EKG:      URGENT CARE COURSE:     Vitals: 03/20/23 1434   BP: 119/64   Pulse: 82   Resp: 16   Temp: 99 °F (37.2 °C)   SpO2: 97%   Weight: 200 lb (90.7 kg)   Height: 5' 4\" (1.626 m)       Medications - No data to display         PROCEDURES:  None    FINAL IMPRESSION      1. Strain of thoracic back region          DISPOSITION/ PLAN     Patient presents for what is likely a strain of the thoracic back region. Will place patient on naproxen and Flexeril for treatment of symptoms. Advised to ice. Switch to heating pad if this helps. May try massage. Follow-up primary care provider return here if no improvement of symptoms in 3 to 5 days. Sooner if worse. PATIENT REFERRED TO:  No primary care provider on file. No primary physician on file.       DISCHARGE MEDICATIONS:  Discharge Medication List as of 3/20/2023  2:47 PM        START taking these medications    Details   naproxen (NAPROSYN) 500 MG tablet Take 1 tablet by mouth 2 times daily (with meals), Disp-60 tablet, R-0Normal      cyclobenzaprine (FLEXERIL) 10 MG tablet Take 1 tablet by mouth 3 times daily as needed for Muscle spasms, Disp-21 tablet, R-0Normal             Discharge Medication List as of 3/20/2023  2:47 PM        STOP taking these medications       DULoxetine (CYMBALTA) 30 MG extended release capsule Comments:   Reason for Stopping:         Pregabalin (LYRICA PO) Comments:   Reason for Stopping:               Discharge Medication List as of 3/20/2023  2:47 PM          PHILL Washington CNP    (Please note that portions of this note were completed with a voice recognition program. Efforts were made to edit the dictations but occasionally words are mis-transcribed.)           PHILL Washington CNP  03/20/23 3324

## 2023-03-20 NOTE — ED NOTES
Pt complains of left mid back pain for several days. States she did yoga on Saturday and that night she began to have pain with movement. States pain is intermittent sharp 10/10 when they come.      Toño Hinson RN  03/20/23 0408

## 2023-03-20 NOTE — DISCHARGE INSTRUCTIONS
Ice to the area a few times a day will be helpful    Gentle stretches as tolerated    Naproxen twice daily with food    Cyclobenzaprine as directed as needed for muscle relaxer. Do not drive or drink alcohol after taking this medication    Follow-up with primary care provider return here if no improvement of symptoms in 5 days.   Return sooner if worse

## 2023-04-26 ENCOUNTER — HOSPITAL ENCOUNTER (EMERGENCY)
Age: 53
Discharge: HOME | End: 2023-04-26
Payer: COMMERCIAL

## 2023-04-26 VITALS
SYSTOLIC BLOOD PRESSURE: 139 MMHG | HEART RATE: 73 BPM | DIASTOLIC BLOOD PRESSURE: 67 MMHG | OXYGEN SATURATION: 100 % | RESPIRATION RATE: 20 BRPM | TEMPERATURE: 97.5 F

## 2023-04-26 DIAGNOSIS — L70.0 PUSTULAR ACNE: Primary | ICD-10-CM

## 2023-04-26 PROCEDURE — 99213 OFFICE O/P EST LOW 20 MIN: CPT

## 2023-04-26 PROCEDURE — 99213 OFFICE O/P EST LOW 20 MIN: CPT | Performed by: EMERGENCY MEDICINE

## 2023-04-26 RX ORDER — MINOCYCLINE HYDROCHLORIDE 50 MG/1
50 CAPSULE ORAL 2 TIMES DAILY
Qty: 14 CAPSULE | Refills: 0 | Status: SHIPPED | OUTPATIENT
Start: 2023-04-26 | End: 2023-05-03

## 2023-04-26 ASSESSMENT — ENCOUNTER SYMPTOMS: FACIAL SWELLING: 1

## 2023-04-26 ASSESSMENT — PAIN - FUNCTIONAL ASSESSMENT: PAIN_FUNCTIONAL_ASSESSMENT: NONE - DENIES PAIN

## 2023-04-26 NOTE — ED PROVIDER NOTES
promethazine-dextromethorphan (PROMETHAZINE-DM) 6.25-15 MG/5ML syrup Take 5 mLs by mouth 4 times daily as needed for Cough This medication may make you sleepy. Do not take when operating a motor vehicle or heavy equipment. , Disp-200 mL, R-0Normal      acetaminophen-codeine (TYLENOL #3) 300-30 MG per tablet TAKE 1 TABLET BY MOUTH EVERY 4 TO 6 HOURS AS NEEDED FOR PAIN TAKE WITH FOOD MUST LAST 4 DAYSHistorical Med             ALLERGIES     Patient is is allergic to oxycodone and shellfish allergy. Patients   Immunization History   Administered Date(s) Administered    Influenza Virus Vaccine 10/06/2011, 10/06/2011, 01/11/2013, 01/11/2013    TDaP, ADACEL (age 10y-63y), BOOSTRIX (age 10y+), IM, 0.5mL 09/20/2007       FAMILY HISTORY     Patient's family history includes Diabetes in her mother. SOCIAL HISTORY     Patient  reports that she has never smoked. She has never used smokeless tobacco. She reports that she does not drink alcohol and does not use drugs. PHYSICAL EXAM     ED TRIAGE VITALS  BP: 139/67, Temp: 97.5 °F (36.4 °C), Heart Rate: 73, Resp: 20, SpO2: 100 %,Estimated body mass index is 34.33 kg/m² as calculated from the following:    Height as of 3/20/23: 5' 4\" (1.626 m). Weight as of 3/20/23: 200 lb (90.7 kg). ,No LMP recorded. Patient is premenopausal.    Physical Exam  Constitutional:       General: She is not in acute distress. Appearance: She is normal weight. She is not ill-appearing. HENT:      Head:        Comments: Pustular lesions to the center of the forehead consistent with acne. There is erythema around the lesions. Tender  Musculoskeletal:      Cervical back: Normal range of motion. Skin:     General: Skin is warm and dry. Capillary Refill: Capillary refill takes less than 2 seconds. Neurological:      General: No focal deficit present. Mental Status: She is alert.    Psychiatric:         Mood and Affect: Mood normal.       DIAGNOSTIC RESULTS     Labs:No results

## 2023-04-26 NOTE — DISCHARGE INSTRUCTIONS
Continue facial washes as previous    Minocycline as directed    See your dermatologist as soon as possible for further treatment

## 2023-04-26 NOTE — ED TRIAGE NOTES
Pt to  for acne breakout. Pt reports being new to the area and had a dermatologist in [place] who was going to start her on Accutane but she moved. Pt reports needing new PCP. RN set up apt with residency clinic.

## 2023-07-25 ENCOUNTER — HOSPITAL ENCOUNTER (EMERGENCY)
Age: 53
Discharge: HOME | End: 2023-07-25
Payer: COMMERCIAL

## 2023-07-25 VITALS
SYSTOLIC BLOOD PRESSURE: 131 MMHG | TEMPERATURE: 98.7 F | RESPIRATION RATE: 20 BRPM | WEIGHT: 210 LBS | HEART RATE: 86 BPM | DIASTOLIC BLOOD PRESSURE: 86 MMHG | OXYGEN SATURATION: 100 % | BODY MASS INDEX: 36.05 KG/M2

## 2023-07-25 DIAGNOSIS — R60.9 DEPENDENT EDEMA: Primary | ICD-10-CM

## 2023-07-25 PROCEDURE — 99213 OFFICE O/P EST LOW 20 MIN: CPT | Performed by: NURSE PRACTITIONER

## 2023-07-25 PROCEDURE — 99213 OFFICE O/P EST LOW 20 MIN: CPT

## 2023-07-25 RX ORDER — HYDROCHLOROTHIAZIDE 12.5 MG/1
12.5 CAPSULE, GELATIN COATED ORAL EVERY MORNING
Qty: 30 CAPSULE | Refills: 0 | Status: SHIPPED | OUTPATIENT
Start: 2023-07-25 | End: 2023-08-24

## 2023-07-25 ASSESSMENT — PAIN - FUNCTIONAL ASSESSMENT: PAIN_FUNCTIONAL_ASSESSMENT: NONE - DENIES PAIN

## 2023-07-25 NOTE — ED TRIAGE NOTES
Patient to room with c/o bilateral feet edema beginning one week ago. Denies hypertension, shortness of breath, or cough. 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

## 2023-07-26 NOTE — ED PROVIDER NOTES
- No data to display       PROCEDURES:  None    FINAL IMPRESSION      1. Dependent edema      DISPOSITION/ PLAN   DISPOSITION Decision To Discharge 07/25/2023 07:57:21 PM     Patient here with bilateral dependent edema 1+ pitting. Vitals are stable. Blood pressure slightly elevated. At this time, will start patient on hydrochlorothiazide. Patient voiced needing to follow-up with a PCP to establish care. Appointment scheduled with family medicine residency clinic. Advised patient to elevate her feet at night and when resting. Follow-up as scheduled. Present to the ER for any shortness of breath or worsening symptoms. PATIENT REFERRED TO:  No primary care provider on file. No primary physician on file.       DISCHARGE MEDICATIONS:  Discharge Medication List as of 7/25/2023  8:00 PM        START taking these medications    Details   hydroCHLOROthiazide (MICROZIDE) 12.5 MG capsule Take 1 capsule by mouth every morning, Disp-30 capsule, R-0Normal             Discharge Medication List as of 7/25/2023  8:00 PM          Discharge Medication List as of 7/25/2023  8:00 PM          Emmer Marie, APRN - CNP    (Please note that portions of this note were completed with a voice recognition program. Efforts were made to edit the dictations but occasionally words are mis-transcribed.)            PHILL Liriano CNP  07/27/23 9899

## 2023-07-27 ASSESSMENT — ENCOUNTER SYMPTOMS: SHORTNESS OF BREATH: 0

## 2023-08-30 RX ORDER — HYDROCHLOROTHIAZIDE 12.5 MG/1
12.5 CAPSULE, GELATIN COATED ORAL EVERY MORNING
Qty: 30 CAPSULE | Refills: 0 | OUTPATIENT
Start: 2023-08-30 | End: 2023-09-29

## 2025-02-11 ENCOUNTER — APPOINTMENT (OUTPATIENT)
Dept: ULTRASOUND IMAGING | Age: 55
End: 2025-02-11
Payer: COMMERCIAL

## 2025-02-11 ENCOUNTER — HOSPITAL ENCOUNTER (EMERGENCY)
Age: 55
Discharge: HOME | End: 2025-02-11
Attending: EMERGENCY MEDICINE
Payer: COMMERCIAL

## 2025-02-11 VITALS
RESPIRATION RATE: 16 BRPM | TEMPERATURE: 98 F | DIASTOLIC BLOOD PRESSURE: 95 MMHG | SYSTOLIC BLOOD PRESSURE: 152 MMHG | HEART RATE: 99 BPM | OXYGEN SATURATION: 100 %

## 2025-02-11 DIAGNOSIS — N93.9 VAGINAL BLEEDING: Primary | ICD-10-CM

## 2025-02-11 LAB
ALBUMIN SERPL BCG-MCNC: 4.1 G/DL (ref 3.5–5.1)
ALP SERPL-CCNC: 105 U/L (ref 38–126)
ALT SERPL W/O P-5'-P-CCNC: 27 U/L (ref 11–66)
ANION GAP SERPL CALC-SCNC: 15 MEQ/L (ref 8–16)
AST SERPL-CCNC: 22 U/L (ref 5–40)
B-HCG SERPL QL: NEGATIVE
BASOPHILS ABSOLUTE: 0.1 THOU/MM3 (ref 0–0.1)
BASOPHILS NFR BLD AUTO: 1 %
BILIRUB SERPL-MCNC: < 0.2 MG/DL (ref 0.3–1.2)
BUN SERPL-MCNC: 9 MG/DL (ref 7–22)
CALCIUM SERPL-MCNC: 8.7 MG/DL (ref 8.5–10.5)
CHLORIDE SERPL-SCNC: 100 MEQ/L (ref 98–111)
CO2 SERPL-SCNC: 24 MEQ/L (ref 23–33)
CREAT SERPL-MCNC: 0.7 MG/DL (ref 0.4–1.2)
DEPRECATED RDW RBC AUTO: 49.8 FL (ref 35–45)
EOSINOPHIL NFR BLD AUTO: 3.7 %
EOSINOPHILS ABSOLUTE: 0.3 THOU/MM3 (ref 0–0.4)
ERYTHROCYTE [DISTWIDTH] IN BLOOD BY AUTOMATED COUNT: 15.5 % (ref 11.5–14.5)
GFR SERPL CREATININE-BSD FRML MDRD: > 90 ML/MIN/1.73M2
GLUCOSE SERPL-MCNC: 137 MG/DL (ref 70–108)
HCT VFR BLD AUTO: 37.4 % (ref 37–47)
HGB BLD-MCNC: 12 GM/DL (ref 12–16)
IMM GRANULOCYTES # BLD AUTO: 0.02 THOU/MM3 (ref 0–0.07)
IMM GRANULOCYTES NFR BLD AUTO: 0.3 %
LYMPHOCYTES ABSOLUTE: 1.6 THOU/MM3 (ref 1–4.8)
LYMPHOCYTES NFR BLD AUTO: 21.9 %
MCH RBC QN AUTO: 28.1 PG (ref 26–33)
MCHC RBC AUTO-ENTMCNC: 32.1 GM/DL (ref 32.2–35.5)
MCV RBC AUTO: 87.6 FL (ref 81–99)
MONOCYTES ABSOLUTE: 0.6 THOU/MM3 (ref 0.4–1.3)
MONOCYTES NFR BLD AUTO: 9.1 %
NEUTROPHILS ABSOLUTE: 4.5 THOU/MM3 (ref 1.8–7.7)
NEUTROPHILS NFR BLD AUTO: 64 %
NRBC BLD AUTO-RTO: 0 /100 WBC
OSMOLALITY SERPL CALC.SUM OF ELEC: 278.4 MOSMOL/KG (ref 275–300)
PLATELET # BLD AUTO: 290 THOU/MM3 (ref 130–400)
PMV BLD AUTO: 8.9 FL (ref 9.4–12.4)
POTASSIUM SERPL-SCNC: 3.7 MEQ/L (ref 3.5–5.2)
PROT SERPL-MCNC: 6.8 G/DL (ref 6.1–8)
RBC # BLD AUTO: 4.27 MILL/MM3 (ref 4.2–5.4)
SODIUM SERPL-SCNC: 139 MEQ/L (ref 135–145)
WBC # BLD AUTO: 7.1 THOU/MM3 (ref 4.8–10.8)

## 2025-02-11 PROCEDURE — 36415 COLL VENOUS BLD VENIPUNCTURE: CPT

## 2025-02-11 PROCEDURE — 85025 COMPLETE CBC W/AUTO DIFF WBC: CPT

## 2025-02-11 PROCEDURE — 76830 TRANSVAGINAL US NON-OB: CPT

## 2025-02-11 PROCEDURE — 80053 COMPREHEN METABOLIC PANEL: CPT

## 2025-02-11 PROCEDURE — 99284 EMERGENCY DEPT VISIT MOD MDM: CPT

## 2025-02-11 PROCEDURE — 84703 CHORIONIC GONADOTROPIN ASSAY: CPT

## 2025-02-11 RX ORDER — KETOROLAC TROMETHAMINE 30 MG/ML
30 INJECTION, SOLUTION INTRAMUSCULAR; INTRAVENOUS ONCE
Status: DISCONTINUED | OUTPATIENT
Start: 2025-02-11 | End: 2025-02-12 | Stop reason: HOSPADM

## 2025-02-11 ASSESSMENT — PAIN - FUNCTIONAL ASSESSMENT: PAIN_FUNCTIONAL_ASSESSMENT: 0-10

## 2025-02-11 ASSESSMENT — PAIN SCALES - GENERAL: PAINLEVEL_OUTOF10: 3

## 2025-02-11 NOTE — ED PROVIDER NOTES
status of her mother is unknown.   family history includes Diabetes in her mother.    SOCIAL HISTORY      reports that she has never smoked. She has never used smokeless tobacco. She reports that she does not drink alcohol and does not use drugs.    PHYSICAL EXAM      oral temperature is 98 °F (36.7 °C). Her blood pressure is 152/95 (abnormal) and her pulse is 99. Her respiration is 16 and oxygen saturation is 100%.   Physical Exam  Vitals and nursing note reviewed.   Constitutional:       Appearance: Normal appearance. She is well-developed.   HENT:      Head: Normocephalic and atraumatic.      Nose: Nose normal.      Mouth/Throat:      Mouth: Mucous membranes are moist.   Eyes:      General: No scleral icterus.        Right eye: No discharge.         Left eye: No discharge.      Pupils: Pupils are equal, round, and reactive to light.   Neck:      Trachea: No tracheal deviation.   Cardiovascular:      Rate and Rhythm: Normal rate and regular rhythm.      Heart sounds: Normal heart sounds. No murmur heard.  Pulmonary:      Effort: Pulmonary effort is normal. No respiratory distress.      Breath sounds: No stridor. No wheezing, rhonchi or rales.   Abdominal:      General: Bowel sounds are normal. There is no distension.      Palpations: Abdomen is soft.      Tenderness: There is no abdominal tenderness. There is no guarding or rebound.   Genitourinary:     Comments: Pelvic exam is deferred by the patient.  Musculoskeletal:         General: No swelling or tenderness.      Cervical back: Normal range of motion and neck supple.   Skin:     General: Skin is warm and dry.   Neurological:      Mental Status: She is alert and oriented to person, place, and time.      Cranial Nerves: No cranial nerve deficit.      Sensory: No sensory deficit.      Motor: No weakness.   Psychiatric:         Behavior: Behavior normal.         Thought Content: Thought content normal.         Judgment: Judgment normal.       FORMAL DIAGNOSTIC

## 2025-02-11 NOTE — ED TRIAGE NOTES
Pt arrives to ED from home with c/o vaginal bleeding   Pt reports concern for inconsistent periods and consistency of bleeding  Pt states she went about 2 months with no bleeding, now she has been bleeding for about 11 days. Pt states she is going through a tampon and and pad about 3 times a day  Pt reports concern for large clots as well

## 2025-03-25 ENCOUNTER — LAB (OUTPATIENT)
Dept: LAB | Age: 55
End: 2025-03-25

## 2025-04-02 LAB — CYTOLOGY THIN PREP PAP: NORMAL

## (undated) DEVICE — CLOSURE SKIN FLX NONINVASIVE PRELOC TECHNOLOGY FOR 24IN

## (undated) DEVICE — DRESSING ALG W3XL4IN TRNSPAR ANTIMIC WND CNTCT LAYR W/

## (undated) DEVICE — GLOVE SURG SZ 8 L11.77IN FNGR THK9.8MIL STRW LTX POLYMER

## (undated) DEVICE — PENCIL SMK EVAC ALL IN 1 DSGN ENH VISIBILITY IMPROVED AIR

## (undated) DEVICE — PACK PROCEDURE SURG POD SC SRHP LF

## (undated) DEVICE — GLOVE ORANGE PI 8   MSG9080

## (undated) DEVICE — TX2 PROCEDURE PACK: Brand: TENEX HEALTH TX®

## (undated) DEVICE — APPLIER CLP L9.375IN APER 2.1MM CLS L3.8MM 20 SM TI CLP